# Patient Record
Sex: MALE | Race: WHITE | NOT HISPANIC OR LATINO | URBAN - METROPOLITAN AREA
[De-identification: names, ages, dates, MRNs, and addresses within clinical notes are randomized per-mention and may not be internally consistent; named-entity substitution may affect disease eponyms.]

---

## 2020-05-19 ENCOUNTER — INPATIENT (INPATIENT)
Facility: HOSPITAL | Age: 65
LOS: 1 days | Discharge: HOME | End: 2020-05-21
Payer: COMMERCIAL

## 2020-05-19 VITALS
TEMPERATURE: 99 F | SYSTOLIC BLOOD PRESSURE: 93 MMHG | RESPIRATION RATE: 22 BRPM | DIASTOLIC BLOOD PRESSURE: 62 MMHG | HEART RATE: 82 BPM | OXYGEN SATURATION: 98 %

## 2020-05-19 LAB
ALBUMIN SERPL ELPH-MCNC: 3.2 G/DL — LOW (ref 3.5–5.2)
ALP SERPL-CCNC: 60 U/L — SIGNIFICANT CHANGE UP (ref 30–115)
ALT FLD-CCNC: 86 U/L — HIGH (ref 0–41)
ANION GAP SERPL CALC-SCNC: 14 MMOL/L — SIGNIFICANT CHANGE UP (ref 7–14)
AST SERPL-CCNC: 75 U/L — HIGH (ref 0–41)
BASOPHILS # BLD AUTO: 0.04 K/UL — SIGNIFICANT CHANGE UP (ref 0–0.2)
BASOPHILS NFR BLD AUTO: 0.4 % — SIGNIFICANT CHANGE UP (ref 0–1)
BILIRUB SERPL-MCNC: 0.6 MG/DL — SIGNIFICANT CHANGE UP (ref 0.2–1.2)
BUN SERPL-MCNC: 18 MG/DL — SIGNIFICANT CHANGE UP (ref 10–20)
CALCIUM SERPL-MCNC: 8.4 MG/DL — LOW (ref 8.5–10.1)
CHLORIDE SERPL-SCNC: 99 MMOL/L — SIGNIFICANT CHANGE UP (ref 98–110)
CO2 SERPL-SCNC: 20 MMOL/L — SIGNIFICANT CHANGE UP (ref 17–32)
CREAT SERPL-MCNC: 0.9 MG/DL — SIGNIFICANT CHANGE UP (ref 0.7–1.5)
D DIMER BLD IA.RAPID-MCNC: 674 NG/ML DDU — HIGH (ref 0–230)
EOSINOPHIL # BLD AUTO: 0.07 K/UL — SIGNIFICANT CHANGE UP (ref 0–0.7)
EOSINOPHIL NFR BLD AUTO: 0.8 % — SIGNIFICANT CHANGE UP (ref 0–8)
GLUCOSE SERPL-MCNC: 110 MG/DL — HIGH (ref 70–99)
HCT VFR BLD CALC: 35.2 % — LOW (ref 42–52)
HGB BLD-MCNC: 12.4 G/DL — LOW (ref 14–18)
IMM GRANULOCYTES NFR BLD AUTO: 0.4 % — HIGH (ref 0.1–0.3)
LYMPHOCYTES # BLD AUTO: 2.06 K/UL — SIGNIFICANT CHANGE UP (ref 1.2–3.4)
LYMPHOCYTES # BLD AUTO: 22.2 % — SIGNIFICANT CHANGE UP (ref 20.5–51.1)
MCHC RBC-ENTMCNC: 32.1 PG — HIGH (ref 27–31)
MCHC RBC-ENTMCNC: 35.2 G/DL — SIGNIFICANT CHANGE UP (ref 32–37)
MCV RBC AUTO: 91.2 FL — SIGNIFICANT CHANGE UP (ref 80–94)
MONOCYTES # BLD AUTO: 1.04 K/UL — HIGH (ref 0.1–0.6)
MONOCYTES NFR BLD AUTO: 11.2 % — HIGH (ref 1.7–9.3)
NEUTROPHILS # BLD AUTO: 6.04 K/UL — SIGNIFICANT CHANGE UP (ref 1.4–6.5)
NEUTROPHILS NFR BLD AUTO: 65 % — SIGNIFICANT CHANGE UP (ref 42.2–75.2)
NRBC # BLD: 0 /100 WBCS — SIGNIFICANT CHANGE UP (ref 0–0)
NT-PROBNP SERPL-SCNC: 4787 PG/ML — HIGH (ref 0–300)
PLATELET # BLD AUTO: 254 K/UL — SIGNIFICANT CHANGE UP (ref 130–400)
POTASSIUM SERPL-MCNC: 4.8 MMOL/L — SIGNIFICANT CHANGE UP (ref 3.5–5)
POTASSIUM SERPL-SCNC: 4.8 MMOL/L — SIGNIFICANT CHANGE UP (ref 3.5–5)
PROT SERPL-MCNC: 6.7 G/DL — SIGNIFICANT CHANGE UP (ref 6–8)
RBC # BLD: 3.86 M/UL — LOW (ref 4.7–6.1)
RBC # FLD: 12.2 % — SIGNIFICANT CHANGE UP (ref 11.5–14.5)
SODIUM SERPL-SCNC: 133 MMOL/L — LOW (ref 135–146)
TROPONIN T SERPL-MCNC: 0.57 NG/ML — CRITICAL HIGH
WBC # BLD: 9.29 K/UL — SIGNIFICANT CHANGE UP (ref 4.8–10.8)
WBC # FLD AUTO: 9.29 K/UL — SIGNIFICANT CHANGE UP (ref 4.8–10.8)

## 2020-05-19 PROCEDURE — 71046 X-RAY EXAM CHEST 2 VIEWS: CPT | Mod: 26

## 2020-05-19 PROCEDURE — 93010 ELECTROCARDIOGRAM REPORT: CPT

## 2020-05-19 PROCEDURE — 99285 EMERGENCY DEPT VISIT HI MDM: CPT

## 2020-05-19 NOTE — ED PROVIDER NOTE - PHYSICAL EXAMINATION
CONST: Well appearing in NAD  EYES: PERRL, EOMI, Sclera and conjunctiva clear.  ENT: No nasal discharge. Oropharynx normal appearing, no erythema or exudates. No abscess or swelling. Uvula midline.   NECK: Non-tender, no meningeal signs. normal ROM. supple   CARD: Normal S1 S2; Normal rate and rhythm  RESP: Equal BS B/L, No wheezes, rhonchi or rales. No distress  GI: Soft, non-tender, non-distended. no cva tenderness. normal BS  MS: Normal ROM in all extremities. No midline spinal tenderness. pulses 2 +. no calf tenderness or swelling  SKIN: Warm, dry, no acute rashes. Good turgor  NEURO: A&Ox3, No focal deficits. Strength 5/5 with no sensory deficits. Steady gait. Finger to nose intact. Negative pronator drift

## 2020-05-19 NOTE — ED PROVIDER NOTE - OBJECTIVE STATEMENT
64 year old male with pmhx of htn, and hld, cad, recent stent placed 1 week ago at [facility], presents with dyspnea on exertion, intermittent x 1 week. pt denies chest pain, cough, fever, chills, calf pain or swelling.

## 2020-05-19 NOTE — ED ADULT NURSE NOTE - OBJECTIVE STATEMENT
Patient presents to ED c/o dyspnea on exertion associated with intermittent chest pain x 1 week. Patient states he had a stent placed one week ago

## 2020-05-19 NOTE — ED PROVIDER NOTE - ATTENDING CONTRIBUTION TO CARE
I personally evaluated the patient. I reviewed the Resident’s or Physician Assistant’s note (as assigned above), and agree with the findings and plan except as documented in my note.    64 year old male with pmhx of htn, and hld, cad, recent stent placed 1 week ago at [facility], presents with dyspnea on exertion, intermittent x 1 week. No fever. No cough. No abd pain. no leg swelling.     CONSTITUTIONAL: Well-developed; well-nourished; in no acute distress. Sitting up and providing appropriate history and physical examination  SKIN: skin exam is warm and dry, no acute rash.  HEAD: Normocephalic; atraumatic.  EYES: PERRL, 3 mm bilateral, no nystagmus, EOM intact; conjunctiva and sclera clear.  ENT: No nasal discharge; airway clear.  NECK: Supple; non tender.+ full passive ROM in all directions. No JVD  CARD: S1, S2 normal; no murmurs, gallops, or rubs. Regular rate and rhythm. + Symmetric Strong Pulses  RESP: bibasilar rales   ABD: soft; non-distended; non-tender. No Rebound, No Gaurding, No signs of peritnitis, No CVA tenderness  EXT: Normal ROM. No clubbing, cyanosis or edema. Dp and Pt Pulses intact. Cap refill less than 3 seconds  NEURO: CN 2-12 intact, normal finger to nose, normal romberg, stable gait, no sensory or motor deficits, Alert, oriented, grossly unremarkable. No Focal deficits. GCS 15. NIH 0  PSYCH: Cooperative, appropriate.    Plan- labs, ecg, cxr, reassess

## 2020-05-19 NOTE — ED ADULT TRIAGE NOTE - CHIEF COMPLAINT QUOTE
S/p cardiac stent dc home yesterday, has been having SOB since last night, tested 3x for COVID, negative 3x

## 2020-05-19 NOTE — ED PROVIDER NOTE - PROGRESS NOTE DETAILS
discussed labs with dr oscar, pt has elevated trop and bnp, will admit to tele after CT chest is performed New onset chf, elevated troponin. Cardiology consulted. Admitted to telemetry

## 2020-05-19 NOTE — ED PROVIDER NOTE - CARE PLAN
Principal Discharge DX:	Dyspnea on exertion  Secondary Diagnosis:	CHF (congestive heart failure)  Secondary Diagnosis:	Elevated troponin

## 2020-05-20 ENCOUNTER — TRANSCRIPTION ENCOUNTER (OUTPATIENT)
Age: 65
End: 2020-05-20

## 2020-05-20 DIAGNOSIS — Z98.890 OTHER SPECIFIED POSTPROCEDURAL STATES: Chronic | ICD-10-CM

## 2020-05-20 LAB
ALBUMIN SERPL ELPH-MCNC: 3.1 G/DL — LOW (ref 3.5–5.2)
ALP SERPL-CCNC: 58 U/L — SIGNIFICANT CHANGE UP (ref 30–115)
ALT FLD-CCNC: 78 U/L — HIGH (ref 0–41)
ANION GAP SERPL CALC-SCNC: 14 MMOL/L — SIGNIFICANT CHANGE UP (ref 7–14)
AST SERPL-CCNC: 50 U/L — HIGH (ref 0–41)
BASOPHILS # BLD AUTO: 0.04 K/UL — SIGNIFICANT CHANGE UP (ref 0–0.2)
BASOPHILS NFR BLD AUTO: 0.5 % — SIGNIFICANT CHANGE UP (ref 0–1)
BILIRUB SERPL-MCNC: 0.6 MG/DL — SIGNIFICANT CHANGE UP (ref 0.2–1.2)
BUN SERPL-MCNC: 17 MG/DL — SIGNIFICANT CHANGE UP (ref 10–20)
CALCIUM SERPL-MCNC: 8.5 MG/DL — SIGNIFICANT CHANGE UP (ref 8.5–10.1)
CHLORIDE SERPL-SCNC: 103 MMOL/L — SIGNIFICANT CHANGE UP (ref 98–110)
CO2 SERPL-SCNC: 21 MMOL/L — SIGNIFICANT CHANGE UP (ref 17–32)
CREAT SERPL-MCNC: 0.8 MG/DL — SIGNIFICANT CHANGE UP (ref 0.7–1.5)
EOSINOPHIL # BLD AUTO: 0.09 K/UL — SIGNIFICANT CHANGE UP (ref 0–0.7)
EOSINOPHIL NFR BLD AUTO: 1.1 % — SIGNIFICANT CHANGE UP (ref 0–8)
GLUCOSE SERPL-MCNC: 95 MG/DL — SIGNIFICANT CHANGE UP (ref 70–99)
HCT VFR BLD CALC: 33.2 % — LOW (ref 42–52)
HCV AB S/CO SERPL IA: 0.03 COI — SIGNIFICANT CHANGE UP
HCV AB SERPL-IMP: SIGNIFICANT CHANGE UP
HGB BLD-MCNC: 11.2 G/DL — LOW (ref 14–18)
IMM GRANULOCYTES NFR BLD AUTO: 0.3 % — SIGNIFICANT CHANGE UP (ref 0.1–0.3)
LYMPHOCYTES # BLD AUTO: 2.5 K/UL — SIGNIFICANT CHANGE UP (ref 1.2–3.4)
LYMPHOCYTES # BLD AUTO: 31.9 % — SIGNIFICANT CHANGE UP (ref 20.5–51.1)
MAGNESIUM SERPL-MCNC: 2.2 MG/DL — SIGNIFICANT CHANGE UP (ref 1.8–2.4)
MCHC RBC-ENTMCNC: 30.6 PG — SIGNIFICANT CHANGE UP (ref 27–31)
MCHC RBC-ENTMCNC: 33.7 G/DL — SIGNIFICANT CHANGE UP (ref 32–37)
MCV RBC AUTO: 90.7 FL — SIGNIFICANT CHANGE UP (ref 80–94)
MONOCYTES # BLD AUTO: 0.9 K/UL — HIGH (ref 0.1–0.6)
MONOCYTES NFR BLD AUTO: 11.5 % — HIGH (ref 1.7–9.3)
NEUTROPHILS # BLD AUTO: 4.29 K/UL — SIGNIFICANT CHANGE UP (ref 1.4–6.5)
NEUTROPHILS NFR BLD AUTO: 54.7 % — SIGNIFICANT CHANGE UP (ref 42.2–75.2)
NRBC # BLD: 0 /100 WBCS — SIGNIFICANT CHANGE UP (ref 0–0)
PLATELET # BLD AUTO: 254 K/UL — SIGNIFICANT CHANGE UP (ref 130–400)
POTASSIUM SERPL-MCNC: 3.7 MMOL/L — SIGNIFICANT CHANGE UP (ref 3.5–5)
POTASSIUM SERPL-SCNC: 3.7 MMOL/L — SIGNIFICANT CHANGE UP (ref 3.5–5)
PROT SERPL-MCNC: 6.2 G/DL — SIGNIFICANT CHANGE UP (ref 6–8)
RBC # BLD: 3.66 M/UL — LOW (ref 4.7–6.1)
RBC # FLD: 12.2 % — SIGNIFICANT CHANGE UP (ref 11.5–14.5)
SARS-COV-2 RNA SPEC QL NAA+PROBE: SIGNIFICANT CHANGE UP
SODIUM SERPL-SCNC: 138 MMOL/L — SIGNIFICANT CHANGE UP (ref 135–146)
TROPONIN T SERPL-MCNC: 0.47 NG/ML — CRITICAL HIGH
WBC # BLD: 7.84 K/UL — SIGNIFICANT CHANGE UP (ref 4.8–10.8)
WBC # FLD AUTO: 7.84 K/UL — SIGNIFICANT CHANGE UP (ref 4.8–10.8)

## 2020-05-20 PROCEDURE — 71275 CT ANGIOGRAPHY CHEST: CPT | Mod: 26

## 2020-05-20 PROCEDURE — 93010 ELECTROCARDIOGRAM REPORT: CPT

## 2020-05-20 RX ORDER — CARVEDILOL PHOSPHATE 80 MG/1
1 CAPSULE, EXTENDED RELEASE ORAL
Qty: 30 | Refills: 0
Start: 2020-05-20 | End: 2020-06-18

## 2020-05-20 RX ORDER — FUROSEMIDE 40 MG
40 TABLET ORAL DAILY
Refills: 0 | Status: DISCONTINUED | OUTPATIENT
Start: 2020-05-20 | End: 2020-05-21

## 2020-05-20 RX ORDER — ASPIRIN/CALCIUM CARB/MAGNESIUM 324 MG
81 TABLET ORAL DAILY
Refills: 0 | Status: DISCONTINUED | OUTPATIENT
Start: 2020-05-20 | End: 2020-05-21

## 2020-05-20 RX ORDER — CLOPIDOGREL BISULFATE 75 MG/1
75 TABLET, FILM COATED ORAL DAILY
Refills: 0 | Status: DISCONTINUED | OUTPATIENT
Start: 2020-05-20 | End: 2020-05-21

## 2020-05-20 RX ORDER — CEFDINIR 250 MG/5ML
1 POWDER, FOR SUSPENSION ORAL
Qty: 0 | Refills: 0 | DISCHARGE

## 2020-05-20 RX ORDER — ATORVASTATIN CALCIUM 80 MG/1
80 TABLET, FILM COATED ORAL AT BEDTIME
Refills: 0 | Status: DISCONTINUED | OUTPATIENT
Start: 2020-05-20 | End: 2020-05-21

## 2020-05-20 RX ORDER — AZITHROMYCIN 500 MG/1
1 TABLET, FILM COATED ORAL
Qty: 0 | Refills: 0 | DISCHARGE

## 2020-05-20 RX ORDER — ASPIRIN/CALCIUM CARB/MAGNESIUM 324 MG
325 TABLET ORAL ONCE
Refills: 0 | Status: COMPLETED | OUTPATIENT
Start: 2020-05-20 | End: 2020-05-20

## 2020-05-20 RX ORDER — ENOXAPARIN SODIUM 100 MG/ML
40 INJECTION SUBCUTANEOUS DAILY
Refills: 0 | Status: DISCONTINUED | OUTPATIENT
Start: 2020-05-20 | End: 2020-05-21

## 2020-05-20 RX ORDER — SPIRONOLACTONE 25 MG/1
1 TABLET, FILM COATED ORAL
Qty: 30 | Refills: 0
Start: 2020-05-20 | End: 2020-06-18

## 2020-05-20 RX ADMIN — Medication 81 MILLIGRAM(S): at 12:21

## 2020-05-20 RX ADMIN — Medication 40 MILLIGRAM(S): at 06:27

## 2020-05-20 RX ADMIN — ATORVASTATIN CALCIUM 80 MILLIGRAM(S): 80 TABLET, FILM COATED ORAL at 21:37

## 2020-05-20 RX ADMIN — CLOPIDOGREL BISULFATE 75 MILLIGRAM(S): 75 TABLET, FILM COATED ORAL at 12:21

## 2020-05-20 RX ADMIN — Medication 325 MILLIGRAM(S): at 01:44

## 2020-05-20 NOTE — DISCHARGE NOTE NURSING/CASE MANAGEMENT/SOCIAL WORK - PATIENT PORTAL LINK FT
You can access the FollowMyHealth Patient Portal offered by [facility] by registering at the following website: [email or web address]. By joining [facility]’s FollowMyHealth portal, you will also be able to view your health information using other applications (apps) compatible with our system.

## 2020-05-20 NOTE — H&P ADULT - ASSESSMENT
64 year old male patient with past medical history of hypertension and dyslipidemia, admitted last Monday to [facility] for ACS, underwent cardiac cath with stent placement presenting for dyspnea and generalized malaise     # Dyspnea on exertion, suspected new onset CHF secondary to ischemic cardiomyopathy   - CT Angio on admission showing interstitial edema, BNP 4787   - On Lasix 20 mg PO daily at home, will switch to 40 mg IV daily   - Strict Is and Os and daily weights   - Fluid restriction 1200 mL/day   - Follow-up echocardiogram   - Was on metoprolol ER 25 mg daily stopped during his most recent [facility] hospitalization for hypotension. Will hold off now given suspicion of CHF exacerbation. When stable on discharge consider restarting beta blockers and starting ACEi or ARB   - Follow up SARS-CoV-2 PCR (negative x 3 at [facility] last week)   - Will hold antibiotics for now given low suspicion for CAP     # CAD s/p stent in May 2020   - Continue Aspirin 81 mg daily, Plavix 75 mg daily and Lipitor 80 mg qhs. Consider restarting beta blockers on discharge once CHF exacerbation controlled   - Troponin T 0.57 with no active chest pain, likely demand ischemia from CHF vs elevated from previous ACS. Continue to trend     # History of hypertension   - Patient states that his BP tends to fluctuate. Hypotensive on admission. Will hold anti-hypertensive meds for now     # Dyslipidemia   - Continue Lipitor 80 mg qhs     # Transaminitis : Likely statin induced   - Given recent ACS will continue Lipitor for now.   - Monitor LFTs if up-trending consider holding Lipitor or decreasing the dose     # Miscellaneous   - DVT prophylaxis : Lovenox 40 mg daily   - GI prophylaxis not indicated   - Diet : DASH TLC fluid restriction 1200 mL/day   - Activity : Ambulate as tolerated   - Code status : Full code 64 year old male patient with past medical history of hypertension and dyslipidemia, admitted last Monday to [facility] for ACS, underwent cardiac cath with stent placement presenting for dyspnea and generalized malaise     # Dyspnea on exertion, suspected new onset CHF secondary to ischemic cardiomyopathy   - CT Angio on admission showing interstitial edema, BNP 4787   - On Lasix 20 mg PO daily at home, will switch to 40 mg IV daily   - Strict Is and Os and daily weights   - Fluid restriction 1200 mL/day   - Follow-up echocardiogram   - Was on metoprolol ER 25 mg daily stopped during his most recent [facility] hospitalization for hypotension. Will hold off now given suspicion of CHF exacerbation. When stable on discharge consider restarting beta blockers and starting ACEi or ARB   - Follow up SARS-CoV-2 PCR (negative x 3 at [facility] last week)   - Will hold antibiotics for now given low suspicion for CAP   will add Spironolactone 25mg po daily  add Coreg 3.125mg po q12    # CAD s/p stent in May 2020   - Continue Aspirin 81 mg daily, Plavix 75 mg daily and Lipitor 80 mg qhs. Consider restarting beta blockers on discharge once CHF exacerbation controlled   - Troponin T 0.57 with no active chest pain, likely demand ischemia from CHF vs elevated from previous ACS. Continue to trend     # History of hypertension   - Patient states that his BP tends to fluctuate. Hypotensive on admission. Will hold anti-hypertensive meds for now     # Dyslipidemia   - Continue Lipitor 80 mg qhs     # Transaminitis : Likely statin induced   - Given recent ACS will continue Lipitor for now.   - Monitor LFTs if up-trending consider holding Lipitor or decreasing the dose     # Miscellaneous   - DVT prophylaxis : Lovenox 40 mg daily   - GI prophylaxis not indicated   - Diet : DASH TLC fluid restriction 1200 mL/day   - Activity : Ambulate as tolerated   - Code status : Full code

## 2020-05-20 NOTE — DISCHARGE NOTE PROVIDER - NSDCCPCAREPLAN_GEN_ALL_CORE_FT
PRINCIPAL DISCHARGE DIAGNOSIS  Diagnosis: Dyspnea on exertion  Assessment and Plan of Treatment: you have suspected congestive heart failure.  your breathing and oxygen levels improved with IV lasix.  please take medications as presribed and follow up as outpatient with Dr. Monahan within 1 week.

## 2020-05-20 NOTE — H&P ADULT - NSHPPHYSICALEXAM_GEN_ALL_CORE
ICU Vital Signs Last 24 Hrs  T(C): 37.3 (19 May 2020 21:31), Max: 37.3 (19 May 2020 21:31)  T(F): 99.2 (19 May 2020 21:31), Max: 99.2 (19 May 2020 21:31)  HR: 82 (19 May 2020 21:31) (82 - 82)  BP: 93/62 (19 May 2020 21:31) (93/62 - 93/62)  BP(mean): --  ABP: --  ABP(mean): --  RR: 22 (19 May 2020 21:31) (22 - 22)  SpO2: 98% (19 May 2020 21:31) (98% - 98%)    General : No acute distress, alert and oriented   Pulmonary : On room air, no signs of respiratory distress, decreased breath sounds at the bases, no crackles or wheezing   Cardiovascular : Regular S1S2   Abdomen : Soft nontender nondistended   Extremities : No lower extremity edema, positive peripheral pulses

## 2020-05-20 NOTE — DISCHARGE NOTE PROVIDER - HOSPITAL COURSE
64 year old male patient with past medical history of hypertension and dyslipidemia, admitted last Monday to [facility] for ACS, underwent cardiac cath with stent placement (unknown how many stents and which vessels, not documented in discharge document from [facility]) and was started on Aspirin, Lipitor, Metoprolol and Brilinta. The patient states that he was discharged after 6 days however was re-admitted on Sunday for general malaise and intermittent dyspnea on exertion. His Brilinta was switched to Plavix and he was started on Azithromycin. Per the patient he was swabbed 3 times for COVID-19 with negative results. He was discharged on Monday however continued experiencing malaise. He also reports intermittent productive cough and denies any dyspnea currently. Also no fever / chills, no weight gain, no wheezing, no chest pain, no palpitations, no abdominal pain, no nausea / vomiting, no diarrhea, no  symptoms. He was told by Dr. Monahan to present to the ED for further evaluation     In the ED : Troponin T 0.57, BNP 4787. BP 93/62, HR 82, RR 22, Temp 99.2, SpO2 98 % on room air. Ct Angio of the chest negative for PE or pulmonary consolidation but demonstrated small left and trace right pleural effusions with bilateral interlobular septal thickening suggesting edema pattern. Patient was admitted for suspected new onset CHF.    patient received IV Lasix with resolution of the dyspnea. patient is medically stable and cleared to be discharged home to follow up with Dr. Monahan in 1 week as outpatient.

## 2020-05-20 NOTE — H&P ADULT - NSHPLABSRESULTS_GEN_ALL_CORE
CBC :                           12.4   9.29  )-----------( 254      ( 19 May 2020 22:30 )             35.2     CMP :     05-19    133<L>  |  99  |  18  ----------------------------<  110<H>  4.8   |  20  |  0.9    Ca    8.4<L>      19 May 2020 22:30    TPro  6.7  /  Alb  3.2<L>  /  TBili  0.6  /  DBili  x   /  AST  75<H>  /  ALT  86<H>  /  AlkPhos  60  05-19    CARDIAC MARKERS ( 19 May 2020 22:30 )  x     / 0.57 ng/mL / x     / x     / x        Coagulation panel :   D-Dimer Assay, Quantitative (05.19.20 @ 22:50)    D-Dimer Assay, Quantitative: 674 ng/mL DDU    Serum Pro-Brain Natriuretic Peptide (05.19.20 @ 22:30)    Serum Pro-Brain Natriuretic Peptide: 4787 pg/mL

## 2020-05-20 NOTE — H&P ADULT - HISTORY OF PRESENT ILLNESS
64 year old male patient with past medical history of hypertension and dyslipidemia, admitted last Monday to [facility] for ACS, underwent cardiac cath with stent placement (unknown how many stents and which vessels, not documented in discharge document from [facility]) and was started on Aspirin, Lipitor, Metoprolol and Brilinta. The patient states that he was discharged after 6 days however was re-admitted on Sunday for general malaise and intermittent dyspnea on exertion. His Brilinta was switched to Plavix and he was started on Azithromycin. Per the patient he was swabbed 3 times for COVID-19 with negative results. He was discharged on Monday however continued experiencing malaise. He also reports intermittent productive cough and denies any dyspnea currently. Also no fever / chills, no weight gain, no wheezing, no chest pain, no palpitations, no abdominal pain, no nausea / vomiting, no diarrhea, no  symptoms. He was told by Dr. Monahan to present to the ED for further evaluation   In the ED : Troponin T 0.57, BNP 4787. BP 93/62, HR 82, RR 22, Temp 99.2, SpO2 98 % on room air. Ct Angio of the chest negative for PE or pulmonary consolidation but demonstrated small left and trace right pleural effusions with bilateral interlobular septal thickening suggesting edema pattern. Patient was admitted for suspected new onset CHF 64 year old male patient with past medical history of hypertension and dyslipidemia, admitted last Monday to [facility] for ACS, underwent cardiac cath with stent placement (unknown how many stents and which vessels, not documented in discharge document from [facility]) and was started on Aspirin, Lipitor, Metoprolol and Brilinta. The patient states that he was discharged after 6 days however was re-admitted on Sunday for general malaise and intermittent dyspnea on exertion. His Brilinta was switched to Plavix and he was started on Azithromycin. Per the patient he was swabbed 3 times for COVID-19 with negative results. He was discharged on Monday however continued experiencing malaise. He also reports intermittent productive cough and denies any dyspnea currently. Also no fever / chills, no weight gain, no wheezing, no chest pain, no palpitations, no abdominal pain, no nausea / vomiting, no diarrhea, no  symptoms. He was told by Dr. Monahan to present to the ED for further evaluation   In the ED : Troponin T 0.57, BNP 4787. BP 93/62, HR 82, RR 22, Temp 99.2, SpO2 98 % on room air. Ct Angio of the chest negative for PE or pulmonary consolidation but demonstrated small left and trace right pleural effusions with bilateral interlobular septal thickening suggesting edema pattern. Patient was admitted for suspected new onset CHF  feels better now but not good he says

## 2020-05-21 VITALS
RESPIRATION RATE: 20 BRPM | DIASTOLIC BLOOD PRESSURE: 65 MMHG | TEMPERATURE: 97 F | HEART RATE: 73 BPM | SYSTOLIC BLOOD PRESSURE: 96 MMHG

## 2020-05-21 LAB
ANION GAP SERPL CALC-SCNC: 15 MMOL/L — HIGH (ref 7–14)
BASOPHILS # BLD AUTO: 0.05 K/UL — SIGNIFICANT CHANGE UP (ref 0–0.2)
BASOPHILS NFR BLD AUTO: 0.7 % — SIGNIFICANT CHANGE UP (ref 0–1)
BUN SERPL-MCNC: 19 MG/DL — SIGNIFICANT CHANGE UP (ref 10–20)
CALCIUM SERPL-MCNC: 8.5 MG/DL — SIGNIFICANT CHANGE UP (ref 8.5–10.1)
CHLORIDE SERPL-SCNC: 101 MMOL/L — SIGNIFICANT CHANGE UP (ref 98–110)
CO2 SERPL-SCNC: 23 MMOL/L — SIGNIFICANT CHANGE UP (ref 17–32)
CREAT SERPL-MCNC: 0.9 MG/DL — SIGNIFICANT CHANGE UP (ref 0.7–1.5)
EOSINOPHIL # BLD AUTO: 0.09 K/UL — SIGNIFICANT CHANGE UP (ref 0–0.7)
EOSINOPHIL NFR BLD AUTO: 1.3 % — SIGNIFICANT CHANGE UP (ref 0–8)
GLUCOSE SERPL-MCNC: 103 MG/DL — HIGH (ref 70–99)
HCT VFR BLD CALC: 33.8 % — LOW (ref 42–52)
HGB BLD-MCNC: 12 G/DL — LOW (ref 14–18)
IMM GRANULOCYTES NFR BLD AUTO: 0.6 % — HIGH (ref 0.1–0.3)
LYMPHOCYTES # BLD AUTO: 2.05 K/UL — SIGNIFICANT CHANGE UP (ref 1.2–3.4)
LYMPHOCYTES # BLD AUTO: 28.6 % — SIGNIFICANT CHANGE UP (ref 20.5–51.1)
MAGNESIUM SERPL-MCNC: 2.2 MG/DL — SIGNIFICANT CHANGE UP (ref 1.8–2.4)
MCHC RBC-ENTMCNC: 32.1 PG — HIGH (ref 27–31)
MCHC RBC-ENTMCNC: 35.5 G/DL — SIGNIFICANT CHANGE UP (ref 32–37)
MCV RBC AUTO: 90.4 FL — SIGNIFICANT CHANGE UP (ref 80–94)
MONOCYTES # BLD AUTO: 0.77 K/UL — HIGH (ref 0.1–0.6)
MONOCYTES NFR BLD AUTO: 10.7 % — HIGH (ref 1.7–9.3)
NEUTROPHILS # BLD AUTO: 4.18 K/UL — SIGNIFICANT CHANGE UP (ref 1.4–6.5)
NEUTROPHILS NFR BLD AUTO: 58.1 % — SIGNIFICANT CHANGE UP (ref 42.2–75.2)
NRBC # BLD: 0 /100 WBCS — SIGNIFICANT CHANGE UP (ref 0–0)
PLATELET # BLD AUTO: 266 K/UL — SIGNIFICANT CHANGE UP (ref 130–400)
POTASSIUM SERPL-MCNC: 4 MMOL/L — SIGNIFICANT CHANGE UP (ref 3.5–5)
POTASSIUM SERPL-SCNC: 4 MMOL/L — SIGNIFICANT CHANGE UP (ref 3.5–5)
RBC # BLD: 3.74 M/UL — LOW (ref 4.7–6.1)
RBC # FLD: 12.1 % — SIGNIFICANT CHANGE UP (ref 11.5–14.5)
SODIUM SERPL-SCNC: 139 MMOL/L — SIGNIFICANT CHANGE UP (ref 135–146)
WBC # BLD: 7.18 K/UL — SIGNIFICANT CHANGE UP (ref 4.8–10.8)
WBC # FLD AUTO: 7.18 K/UL — SIGNIFICANT CHANGE UP (ref 4.8–10.8)

## 2020-05-21 PROCEDURE — 93306 TTE W/DOPPLER COMPLETE: CPT | Mod: 26

## 2020-05-21 RX ADMIN — CLOPIDOGREL BISULFATE 75 MILLIGRAM(S): 75 TABLET, FILM COATED ORAL at 12:15

## 2020-05-21 RX ADMIN — Medication 81 MILLIGRAM(S): at 12:15

## 2020-05-21 NOTE — PROGRESS NOTE ADULT - SUBJECTIVE AND OBJECTIVE BOX
HPI  65 yo M PMH of hypertension and dyslipidemia, admitted last Monday to [facility] for ACS, underwent cardiac cath with stent placement (unknown how many stents and which vessels, not documented in discharge document from [facility]) and was started on Aspirin, Lipitor, Metoprolol and Brilinta. The patient states that he was discharged after 6 days however was re-admitted on Sunday for general malaise and intermittent dyspnea on exertion. His Brilinta was switched to Plavix and he was started on Azithromycin. Per the patient he was swabbed 3 times for COVID-19 with negative results. He was discharged on Monday however continued experiencing malaise. He also reports intermittent productive cough and denies any dyspnea currently. Also no fever / chills, no weight gain, no wheezing, no chest pain, no palpitations, no abdominal pain, no nausea / vomiting, no diarrhea, no  symptoms. He was told by Dr. Monahan to present to the ED for further evaluation   In the ED : Troponin T 0.57, BNP 4787. BP 93/62, HR 82, RR 22, Temp 99.2, SpO2 98 % on room air. Ct Angio of the chest negative for PE or pulmonary consolidation but demonstrated small left and trace right pleural effusions with bilateral interlobular septal thickening suggesting edema pattern. Patient was admitted for suspected new onset CHF  feels better now but not good he says     Today is hospital day 1d.     INTERVAL HPI / OVERNIGHT EVENTS:  Patient was examined and seen at bedside. This morning he is resting comfortably in bed and reports no new issues or overnight events.     ROS: Otherwise unremarkable     PAST MEDICAL & SURGICAL HISTORY  Dyslipidemia  Hypertension  Coronary artery disease: s/p stent May 2020  History of left inguinal hernia repair    ALLERGIES  No Known Allergies    MEDICATIONS  STANDING MEDICATIONS  aspirin enteric coated 81 milliGRAM(s) Oral daily  atorvastatin 80 milliGRAM(s) Oral at bedtime  clopidogrel Tablet 75 milliGRAM(s) Oral daily  enoxaparin Injectable 40 milliGRAM(s) SubCutaneous daily  furosemide   Injectable 40 milliGRAM(s) IV Push daily    PRN MEDICATIONS    VITALS:  T(F): 98.4  HR: 73  BP: 86/58  RR: 18  SpO2: 98%    PHYSICAL EXAM  GEN: NAD, Resting comfortably in bed  PULM: Clear to auscultation bilaterally, No wheezes  CVS: Regular rate and rhythm, S1-S2, no murmurs  ABD: Soft, non-tender, non-distended, no guarding  EXT: No edema  NEURO: AAOx3, no focal deficits    LABS                        12.0   7.18  )-----------( 266      ( 21 May 2020 05:21 )             33.8     05-20    138  |  103  |  17  ----------------------------<  95  3.7   |  21  |  0.8    Ca    8.5      20 May 2020 04:57  Mg     2.2     05-20    TPro  6.2  /  Alb  3.1<L>  /  TBili  0.6  /  DBili  x   /  AST  50<H>  /  ALT  78<H>  /  AlkPhos  58  05-20      CARDIAC MARKERS ( 20 May 2020 04:57 )  x     / 0.47 ng/mL / x     / x     / x      CARDIAC MARKERS ( 19 May 2020 22:30 )  x     / 0.57 ng/mL / x     / x     / x          RADIOLOGY    < from: CT Angio Chest w/ IV Cont (05.20.20 @ 00:57) >  IMPRESSION:     No CTA evidence of acute pulmonary embolus or pulmonary consolidation.    Small left and trace right pleural effusions with bilateral interlobular septal thickening suggesting a pulmonary congestive/edema pattern.    < end of copied text > HPI  63 yo M PMH of hypertension and dyslipidemia, admitted last Monday to [facility] for ACS, underwent cardiac cath with stent placement (unknown how many stents and which vessels, not documented in discharge document from [facility]) and was started on Aspirin, Lipitor, Metoprolol and Brilinta. The patient states that he was discharged after 6 days however was re-admitted on Sunday for general malaise and intermittent dyspnea on exertion. His Brilinta was switched to Plavix and he was started on Azithromycin. Per the patient he was swabbed 3 times for COVID-19 with negative results. He was discharged on Monday however continued experiencing malaise. He also reports intermittent productive cough and denies any dyspnea currently. Also no fever / chills, no weight gain, no wheezing, no chest pain, no palpitations, no abdominal pain, no nausea / vomiting, no diarrhea, no  symptoms. He was told by Dr. Monahan to present to the ED for further evaluation   In the ED : Troponin T 0.57, BNP 4787. BP 93/62, HR 82, RR 22, Temp 99.2, SpO2 98 % on room air. Ct Angio of the chest negative for PE or pulmonary consolidation but demonstrated small left and trace right pleural effusions with bilateral interlobular septal thickening suggesting edema pattern. Patient was admitted for suspected new onset CHF  feels better now but not good he says     Today is hospital day 1d.     INTERVAL HPI / OVERNIGHT EVENTS:  Patient was examined and seen at bedside. This morning he is resting comfortably in bed and reports no new issues or overnight events. Ready to go home, wants to spend time with family. Patient slept well. Denied CP, SOB, abdominal pain, or changes in BM or urinary habits.     ROS: Otherwise unremarkable     PAST MEDICAL & SURGICAL HISTORY  Dyslipidemia  Hypertension  Coronary artery disease: s/p stent May 2020  History of left inguinal hernia repair    ALLERGIES  No Known Allergies    MEDICATIONS  STANDING MEDICATIONS  aspirin enteric coated 81 milliGRAM(s) Oral daily  atorvastatin 80 milliGRAM(s) Oral at bedtime  clopidogrel Tablet 75 milliGRAM(s) Oral daily  enoxaparin Injectable 40 milliGRAM(s) SubCutaneous daily  furosemide   Injectable 40 milliGRAM(s) IV Push daily    PRN MEDICATIONS    VITALS:  T(F): 98.4  HR: 73  BP: 86/58  RR: 18  SpO2: 98%    PHYSICAL EXAM  GEN: NAD, Resting comfortably in bed  PULM: Clear to auscultation bilaterally, No wheezes. Decreased lung sounds bilaterally in the lung bases, L > R  CVS: Regular rate and rhythm, S1-S2, no murmurs  ABD: Soft, non-tender, non-distended, no guarding  EXT: No edema  NEURO: AAOx3, no focal deficits    LABS                        12.0   7.18  )-----------( 266      ( 21 May 2020 05:21 )             33.8     05-20    138  |  103  |  17  ----------------------------<  95  3.7   |  21  |  0.8    Ca    8.5      20 May 2020 04:57  Mg     2.2     05-20    TPro  6.2  /  Alb  3.1<L>  /  TBili  0.6  /  DBili  x   /  AST  50<H>  /  ALT  78<H>  /  AlkPhos  58  05-20      CARDIAC MARKERS ( 20 May 2020 04:57 )  x     / 0.47 ng/mL / x     / x     / x      CARDIAC MARKERS ( 19 May 2020 22:30 )  x     / 0.57 ng/mL / x     / x     / x          RADIOLOGY    < from: CT Angio Chest w/ IV Cont (05.20.20 @ 00:57) >  IMPRESSION:     No CTA evidence of acute pulmonary embolus or pulmonary consolidation.    Small left and trace right pleural effusions with bilateral interlobular septal thickening suggesting a pulmonary congestive/edema pattern.    < end of copied text >

## 2020-05-21 NOTE — PROGRESS NOTE ADULT - ASSESSMENT
64 year old male patient with past medical history of hypertension and dyslipidemia, admitted last Monday to [facility] for ACS, underwent cardiac cath with stent placement presenting for dyspnea and generalized malaise     #Dyspnea on exertion, suspected new onset CHF secondary to ischemic cardiomyopathy   - DC Today as per Dr. Monahan  - CT Angio on admission showing interstitial edema, BNP 4787   - On Lasix 20 mg PO daily at home, will switch to 40 mg IV daily   - Strict Is and Os and daily weights   - Fluid restriction 1200 mL/day   - Follow-up echocardiogram   - Was on metoprolol ER 25 mg daily stopped during his most recent [facility] hospitalization for hypotension. Will hold off now given suspicion of CHF exacerbation. When stable on discharge consider restarting beta blockers and starting ACEi or ARB   - Follow up SARS-CoV-2 PCR (negative x 3 at [facility] last week)   - Will hold antibiotics for now given low suspicion for CAP   will add Spironolactone 25mg po daily  add Coreg 3.125mg po q12    # CAD s/p stent in May 2020   - Continue Aspirin 81 mg daily, Plavix 75 mg daily and Lipitor 80 mg qhs. Consider restarting beta blockers on discharge once CHF exacerbation controlled   - Troponin T 0.57 with no active chest pain, likely demand ischemia from CHF vs elevated from previous ACS. Continue to trend     # History of hypertension   - Patient states that his BP tends to fluctuate. Hypotensive on admission. Will hold anti-hypertensive meds for now     # Dyslipidemia   - Continue Lipitor 80 mg qhs     # Transaminitis : Likely statin induced   - Given recent ACS will continue Lipitor for now.   - Monitor LFTs if up-trending consider holding Lipitor or decreasing the dose     # Miscellaneous   - DVT prophylaxis : Lovenox 40 mg daily   - GI prophylaxis not indicated   - Diet : DASH TLC fluid restriction 1200 mL/day   - Activity : Ambulate as tolerated   - Code status : Full code 64 year old male patient with past medical history of hypertension and dyslipidemia, admitted last Monday to [facility] for ACS, underwent cardiac cath with stent placement presenting for dyspnea and generalized malaise     #Dyspnea on exertion, suspected new onset CHF secondary to ischemic cardiomyopathy   - DC Today as per Dr. Monahan?  - CT Angio on admission showing interstitial edema, BNP 4787   - On Lasix 20 mg PO daily at home, will switch to 40 mg IV daily   - Strict Is and Os and daily weights   - Fluid restriction 1200 mL/day   - Follow-up echocardiogram - went for it   - Was on metoprolol ER 25 mg daily stopped during his most recent [facility] hospitalization for hypotension. Will hold off now given suspicion of CHF exacerbation. When stable on discharge consider restarting beta blockers and starting ACEi or ARB   - SARS-CoV-2 PCR (negative x 3 at [facility] last week) - negative  - Will hold antibiotics for now given low suspicion for CAP   - Low BP, no spironolactone or coreg for now (doesnt appear to have been started)    # CAD s/p stent in May 2020   - Continue Aspirin 81 mg daily, Plavix 75 mg daily and Lipitor 80 mg qhs. Consider restarting beta blockers on discharge once CHF exacerbation controlled   - Troponin T 0.57 with no active chest pain, likely demand ischemia from CHF vs elevated from previous ACS. Continue to trend     # History of hypertension   - Patient states that his BP tends to fluctuate. Hypotensive on admission. Will hold anti-hypertensive meds for now     # Dyslipidemia   - Continue Lipitor 80 mg qhs     # Transaminitis : Likely statin induced   - Given recent ACS will continue Lipitor for now.   - Monitor LFTs if up-trending consider holding Lipitor or decreasing the dose     # Miscellaneous   - DVT prophylaxis : Lovenox 40 mg daily   - GI prophylaxis not indicated   - Diet : DASH TLC fluid restriction 1200 mL/day   - Activity : Ambulate as tolerated   - Code status : Full code 64 year old male patient with past medical history of hypertension and dyslipidemia, admitted last Monday to [facility] for ACS, underwent cardiac cath with stent placement presenting for dyspnea and generalized malaise     #Dyspnea on exertion, suspected new onset CHF secondary to ischemic cardiomyopathy   - DC Today as per Dr. Monahan. He is aware of his echo and will follow it in the office  - CT Angio on admission showing interstitial edema, BNP 4787   - On Lasix 20 mg PO daily at home, will switch to 40 mg IV daily   - Strict Is and Os and daily weights   - Fluid restriction 1200 mL/day   - Follow-up echocardiogram - went for it   - Was on metoprolol ER 25 mg daily stopped during his most recent [facility] hospitalization for hypotension. Will hold off now given suspicion of CHF exacerbation. When stable on discharge consider restarting beta blockers and starting ACEi or ARB   - SARS-CoV-2 PCR (negative x 3 at [facility] last week) - negative  - Will hold antibiotics for now given low suspicion for CAP   - Low BP, no spironolactone or coreg for now (doesnt appear to have been started)    # CAD s/p stent in May 2020   - Continue Aspirin 81 mg daily, Plavix 75 mg daily and Lipitor 80 mg qhs. Consider restarting beta blockers on discharge once CHF exacerbation controlled   - Troponin T 0.57 with no active chest pain, likely demand ischemia from CHF vs elevated from previous ACS. Continue to trend     # History of hypertension   - Patient states that his BP tends to fluctuate. Hypotensive on admission. Will hold anti-hypertensive meds for now     # Dyslipidemia   - Continue Lipitor 80 mg qhs     # Transaminitis : Likely statin induced   - Given recent ACS will continue Lipitor for now.   - Monitor LFTs if up-trending consider holding Lipitor or decreasing the dose     # Miscellaneous   - DVT prophylaxis : Lovenox 40 mg daily   - GI prophylaxis not indicated   - Diet : DASH TLC fluid restriction 1200 mL/day   - Activity : Ambulate as tolerated   - Code status : Full code

## 2020-05-29 DIAGNOSIS — I25.5 ISCHEMIC CARDIOMYOPATHY: ICD-10-CM

## 2020-05-29 DIAGNOSIS — I95.89 OTHER HYPOTENSION: ICD-10-CM

## 2020-05-29 DIAGNOSIS — Z79.02 LONG TERM (CURRENT) USE OF ANTITHROMBOTICS/ANTIPLATELETS: ICD-10-CM

## 2020-05-29 DIAGNOSIS — Z95.5 PRESENCE OF CORONARY ANGIOPLASTY IMPLANT AND GRAFT: ICD-10-CM

## 2020-05-29 DIAGNOSIS — R06.00 DYSPNEA, UNSPECIFIED: ICD-10-CM

## 2020-05-29 DIAGNOSIS — I25.10 ATHEROSCLEROTIC HEART DISEASE OF NATIVE CORONARY ARTERY WITHOUT ANGINA PECTORIS: ICD-10-CM

## 2020-05-29 DIAGNOSIS — R07.89 OTHER CHEST PAIN: ICD-10-CM

## 2020-05-29 DIAGNOSIS — I50.21 ACUTE SYSTOLIC (CONGESTIVE) HEART FAILURE: ICD-10-CM

## 2020-05-29 DIAGNOSIS — E78.5 HYPERLIPIDEMIA, UNSPECIFIED: ICD-10-CM

## 2020-05-29 DIAGNOSIS — Z79.82 LONG TERM (CURRENT) USE OF ASPIRIN: ICD-10-CM

## 2020-05-29 DIAGNOSIS — I21.09 ST ELEVATION (STEMI) MYOCARDIAL INFARCTION INVOLVING OTHER CORONARY ARTERY OF ANTERIOR WALL: ICD-10-CM

## 2020-05-29 DIAGNOSIS — I23.8 OTHER CURRENT COMPLICATIONS FOLLOWING ACUTE MYOCARDIAL INFARCTION: ICD-10-CM

## 2020-05-29 DIAGNOSIS — R74.0 NONSPECIFIC ELEVATION OF LEVELS OF TRANSAMINASE AND LACTIC ACID DEHYDROGENASE [LDH]: ICD-10-CM

## 2020-05-29 DIAGNOSIS — I11.0 HYPERTENSIVE HEART DISEASE WITH HEART FAILURE: ICD-10-CM

## 2020-05-29 DIAGNOSIS — Z87.891 PERSONAL HISTORY OF NICOTINE DEPENDENCE: ICD-10-CM

## 2020-05-29 DIAGNOSIS — Z79.899 OTHER LONG TERM (CURRENT) DRUG THERAPY: ICD-10-CM

## 2020-05-29 DIAGNOSIS — R79.89 OTHER SPECIFIED ABNORMAL FINDINGS OF BLOOD CHEMISTRY: ICD-10-CM

## 2020-08-05 ENCOUNTER — EMERGENCY (EMERGENCY)
Facility: HOSPITAL | Age: 65
LOS: 0 days | Discharge: HOME | End: 2020-08-06
Attending: EMERGENCY MEDICINE | Admitting: EMERGENCY MEDICINE
Payer: COMMERCIAL

## 2020-08-05 VITALS
DIASTOLIC BLOOD PRESSURE: 74 MMHG | SYSTOLIC BLOOD PRESSURE: 119 MMHG | WEIGHT: 139.99 LBS | HEIGHT: 68 IN | TEMPERATURE: 98 F | RESPIRATION RATE: 19 BRPM | OXYGEN SATURATION: 98 % | HEART RATE: 66 BPM

## 2020-08-05 DIAGNOSIS — Z98.890 OTHER SPECIFIED POSTPROCEDURAL STATES: Chronic | ICD-10-CM

## 2020-08-05 DIAGNOSIS — Z95.5 PRESENCE OF CORONARY ANGIOPLASTY IMPLANT AND GRAFT: ICD-10-CM

## 2020-08-05 DIAGNOSIS — E78.5 HYPERLIPIDEMIA, UNSPECIFIED: ICD-10-CM

## 2020-08-05 DIAGNOSIS — I25.10 ATHEROSCLEROTIC HEART DISEASE OF NATIVE CORONARY ARTERY WITHOUT ANGINA PECTORIS: ICD-10-CM

## 2020-08-05 DIAGNOSIS — R07.9 CHEST PAIN, UNSPECIFIED: ICD-10-CM

## 2020-08-05 DIAGNOSIS — I10 ESSENTIAL (PRIMARY) HYPERTENSION: ICD-10-CM

## 2020-08-05 LAB
ALBUMIN SERPL ELPH-MCNC: 4.5 G/DL — SIGNIFICANT CHANGE UP (ref 3.5–5.2)
ALP SERPL-CCNC: 57 U/L — SIGNIFICANT CHANGE UP (ref 30–115)
ALT FLD-CCNC: 33 U/L — SIGNIFICANT CHANGE UP (ref 0–41)
ANION GAP SERPL CALC-SCNC: 12 MMOL/L — SIGNIFICANT CHANGE UP (ref 7–14)
AST SERPL-CCNC: 24 U/L — SIGNIFICANT CHANGE UP (ref 0–41)
BASOPHILS # BLD AUTO: 0.03 K/UL — SIGNIFICANT CHANGE UP (ref 0–0.2)
BASOPHILS NFR BLD AUTO: 0.5 % — SIGNIFICANT CHANGE UP (ref 0–1)
BILIRUB SERPL-MCNC: 0.4 MG/DL — SIGNIFICANT CHANGE UP (ref 0.2–1.2)
BUN SERPL-MCNC: 19 MG/DL — SIGNIFICANT CHANGE UP (ref 10–20)
CALCIUM SERPL-MCNC: 9.4 MG/DL — SIGNIFICANT CHANGE UP (ref 8.5–10.1)
CHLORIDE SERPL-SCNC: 106 MMOL/L — SIGNIFICANT CHANGE UP (ref 98–110)
CO2 SERPL-SCNC: 24 MMOL/L — SIGNIFICANT CHANGE UP (ref 17–32)
CREAT SERPL-MCNC: 0.8 MG/DL — SIGNIFICANT CHANGE UP (ref 0.7–1.5)
EOSINOPHIL # BLD AUTO: 0.09 K/UL — SIGNIFICANT CHANGE UP (ref 0–0.7)
EOSINOPHIL NFR BLD AUTO: 1.4 % — SIGNIFICANT CHANGE UP (ref 0–8)
GLUCOSE SERPL-MCNC: 109 MG/DL — HIGH (ref 70–99)
HCT VFR BLD CALC: 41.6 % — LOW (ref 42–52)
HGB BLD-MCNC: 14 G/DL — SIGNIFICANT CHANGE UP (ref 14–18)
IMM GRANULOCYTES NFR BLD AUTO: 0.3 % — SIGNIFICANT CHANGE UP (ref 0.1–0.3)
LYMPHOCYTES # BLD AUTO: 2.86 K/UL — SIGNIFICANT CHANGE UP (ref 1.2–3.4)
LYMPHOCYTES # BLD AUTO: 44.6 % — SIGNIFICANT CHANGE UP (ref 20.5–51.1)
MCHC RBC-ENTMCNC: 31.5 PG — HIGH (ref 27–31)
MCHC RBC-ENTMCNC: 33.7 G/DL — SIGNIFICANT CHANGE UP (ref 32–37)
MCV RBC AUTO: 93.7 FL — SIGNIFICANT CHANGE UP (ref 80–94)
MONOCYTES # BLD AUTO: 0.7 K/UL — HIGH (ref 0.1–0.6)
MONOCYTES NFR BLD AUTO: 10.9 % — HIGH (ref 1.7–9.3)
NEUTROPHILS # BLD AUTO: 2.71 K/UL — SIGNIFICANT CHANGE UP (ref 1.4–6.5)
NEUTROPHILS NFR BLD AUTO: 42.3 % — SIGNIFICANT CHANGE UP (ref 42.2–75.2)
NRBC # BLD: 0 /100 WBCS — SIGNIFICANT CHANGE UP (ref 0–0)
NT-PROBNP SERPL-SCNC: 1438 PG/ML — HIGH (ref 0–300)
PLATELET # BLD AUTO: 181 K/UL — SIGNIFICANT CHANGE UP (ref 130–400)
POTASSIUM SERPL-MCNC: 4.1 MMOL/L — SIGNIFICANT CHANGE UP (ref 3.5–5)
POTASSIUM SERPL-SCNC: 4.1 MMOL/L — SIGNIFICANT CHANGE UP (ref 3.5–5)
PROT SERPL-MCNC: 6.8 G/DL — SIGNIFICANT CHANGE UP (ref 6–8)
RBC # BLD: 4.44 M/UL — LOW (ref 4.7–6.1)
RBC # FLD: 13.5 % — SIGNIFICANT CHANGE UP (ref 11.5–14.5)
SODIUM SERPL-SCNC: 142 MMOL/L — SIGNIFICANT CHANGE UP (ref 135–146)
TROPONIN T SERPL-MCNC: <0.01 NG/ML — SIGNIFICANT CHANGE UP
WBC # BLD: 6.41 K/UL — SIGNIFICANT CHANGE UP (ref 4.8–10.8)
WBC # FLD AUTO: 6.41 K/UL — SIGNIFICANT CHANGE UP (ref 4.8–10.8)

## 2020-08-05 PROCEDURE — 93010 ELECTROCARDIOGRAM REPORT: CPT

## 2020-08-05 PROCEDURE — 71045 X-RAY EXAM CHEST 1 VIEW: CPT | Mod: 26

## 2020-08-05 PROCEDURE — 99285 EMERGENCY DEPT VISIT HI MDM: CPT

## 2020-08-05 NOTE — ED PROVIDER NOTE - OBJECTIVE STATEMENT
65 yo male, pmh of htn, hld, cad, presents to ed for cp. started a few days ago, mild, aching, no radiation. Denies fever, chills, sob, le swelling, abd pain.

## 2020-08-05 NOTE — ED PROVIDER NOTE - PHYSICAL EXAMINATION
Physical Exam    Vital Signs: I have reviewed the initial vital signs.  Constitutional: well-nourished, appears stated age, no acute distress  Eyes: Conjunctiva pink, Sclera clear,  Cardiovascular: S1 and S2, regular rate, regular rhythm, well-perfused extremities, radial pulses equal and 2+, pedal pulses 2+ and equal   Respiratory: unlabored respiratory effort, clear to auscultation bilaterally no wheezing, rales and rhonchi  Gastrointestinal: soft, non-tender abdomen, no pulsatile mass, normal bowl sounds  Musculoskeletal: supple neck, no lower extremity edema, no midline tenderness  Integumentary: warm, dry, no rash  Neurologic: awake, alert, nvi

## 2020-08-05 NOTE — ED PROVIDER NOTE - ATTENDING CONTRIBUTION TO CARE
64m h/o cad/stent May 2019, chf (ef 40%), htn, hl p/w CP x 2d. Mid-sternal, intermitt, non-exertional, lasting sev minutes, no specific aggrav/allev factors. No dizziness, diaphoresis, f/c, cough, sob/holliday, nv, abd pain, edema. PMD/Cardio [facility] - has appt next week.    PE:  nad  skin warm, dry  ncat  neck supple  rrr nl s1s2 no mrg  ctab no wrr  abd soft ntnd no palpable masses no rgr  back non-tender no cvat  ext no cce dpi  neuro aaox3 grossly nf exam

## 2020-08-05 NOTE — ED PROVIDER NOTE - CLINICAL SUMMARY MEDICAL DECISION MAKING FREE TEXT BOX
CP, h/o cad w/recent stent may 2020 - ekg no acute ischemia/similar to priors, cxr clear, labs nl - d/w Dr. Monahan, advised pt has 1V disease w/stent, ef 40% 2 recent office echo, ok for discharge to home and may f/u in office james or next week as previously scheduled - all results d/w pt & copies given, strict return precautions discussed, rec outpt f/u as stated

## 2020-08-05 NOTE — ED ADULT NURSE NOTE - OBJECTIVE STATEMENT
Pt comes to ED c/o of chest pain x 2 days with some nausea. Pt reports that he does not currently have chest pain at this time. Pt reports he did have an MI about two months ago. Denies sob, nausea/vomiting.

## 2020-08-05 NOTE — ED PROVIDER NOTE - PATIENT PORTAL LINK FT
You can access the FollowMyHealth Patient Portal offered by [facility] by registering at the following website: [email or web address]. By joining [facility]’s FollowMyHealth portal, you will also be able to view your health information using other applications (apps) compatible with our system.

## 2020-08-05 NOTE — ED ADULT NURSE NOTE - FAMILY HISTORY
Mother  Still living? Unknown  FH: myocardial infarction, Age at diagnosis: Age Unknown  FH: hypertension, Age at diagnosis: Age Unknown

## 2020-08-05 NOTE — ED PROVIDER NOTE - CARE PROVIDER_API CALL
Miguelangel Monahan [facility]  [address] [place] [address]  Phone: [phone]  Fax: [phone]  Follow Up Time: 1-3 Days

## 2020-08-05 NOTE — ED ADULT NURSE NOTE - NSIMPLEMENTINTERV_GEN_ALL_ED
Implemented All Universal Safety Interventions:  [place] to call system. Call bell, personal items and telephone within reach. Instruct patient to call for assistance. Room bathroom lighting operational. Non-slip footwear when patient is off stretcher. Physically safe environment: no spills, clutter or unnecessary equipment. Stretcher in lowest position, wheels locked, appropriate side rails in place.

## 2020-08-06 PROBLEM — I25.10 ATHEROSCLEROTIC HEART DISEASE OF NATIVE CORONARY ARTERY WITHOUT ANGINA PECTORIS: Chronic | Status: ACTIVE | Noted: 2020-05-20

## 2020-08-06 PROBLEM — I10 ESSENTIAL (PRIMARY) HYPERTENSION: Chronic | Status: ACTIVE | Noted: 2020-05-20

## 2020-08-06 PROBLEM — E78.5 HYPERLIPIDEMIA, UNSPECIFIED: Chronic | Status: ACTIVE | Noted: 2020-05-20

## 2021-04-03 ENCOUNTER — EMERGENCY (EMERGENCY)
Facility: HOSPITAL | Age: 66
LOS: 0 days | Discharge: HOME | End: 2021-04-04
Attending: EMERGENCY MEDICINE | Admitting: EMERGENCY MEDICINE
Payer: COMMERCIAL

## 2021-04-03 VITALS
WEIGHT: 154.98 LBS | RESPIRATION RATE: 20 BRPM | HEART RATE: 67 BPM | OXYGEN SATURATION: 98 % | TEMPERATURE: 97 F | DIASTOLIC BLOOD PRESSURE: 71 MMHG | HEIGHT: 68 IN | SYSTOLIC BLOOD PRESSURE: 132 MMHG

## 2021-04-03 DIAGNOSIS — Z79.899 OTHER LONG TERM (CURRENT) DRUG THERAPY: ICD-10-CM

## 2021-04-03 DIAGNOSIS — Z98.890 OTHER SPECIFIED POSTPROCEDURAL STATES: Chronic | ICD-10-CM

## 2021-04-03 DIAGNOSIS — R31.9 HEMATURIA, UNSPECIFIED: ICD-10-CM

## 2021-04-03 DIAGNOSIS — Z79.82 LONG TERM (CURRENT) USE OF ASPIRIN: ICD-10-CM

## 2021-04-03 DIAGNOSIS — I25.10 ATHEROSCLEROTIC HEART DISEASE OF NATIVE CORONARY ARTERY WITHOUT ANGINA PECTORIS: ICD-10-CM

## 2021-04-03 DIAGNOSIS — I10 ESSENTIAL (PRIMARY) HYPERTENSION: ICD-10-CM

## 2021-04-03 DIAGNOSIS — E78.5 HYPERLIPIDEMIA, UNSPECIFIED: ICD-10-CM

## 2021-04-03 PROCEDURE — 99283 EMERGENCY DEPT VISIT LOW MDM: CPT

## 2021-04-04 ENCOUNTER — EMERGENCY (EMERGENCY)
Facility: HOSPITAL | Age: 66
LOS: 0 days | Discharge: HOME | End: 2021-04-04
Attending: STUDENT IN AN ORGANIZED HEALTH CARE EDUCATION/TRAINING PROGRAM | Admitting: STUDENT IN AN ORGANIZED HEALTH CARE EDUCATION/TRAINING PROGRAM
Payer: COMMERCIAL

## 2021-04-04 VITALS
HEIGHT: 68 IN | TEMPERATURE: 98 F | WEIGHT: 160.06 LBS | SYSTOLIC BLOOD PRESSURE: 114 MMHG | RESPIRATION RATE: 18 BRPM | OXYGEN SATURATION: 97 % | HEART RATE: 63 BPM | DIASTOLIC BLOOD PRESSURE: 69 MMHG

## 2021-04-04 VITALS
OXYGEN SATURATION: 98 % | DIASTOLIC BLOOD PRESSURE: 67 MMHG | SYSTOLIC BLOOD PRESSURE: 117 MMHG | RESPIRATION RATE: 18 BRPM | HEART RATE: 67 BPM | TEMPERATURE: 98 F

## 2021-04-04 DIAGNOSIS — Z79.82 LONG TERM (CURRENT) USE OF ASPIRIN: ICD-10-CM

## 2021-04-04 DIAGNOSIS — Z79.899 OTHER LONG TERM (CURRENT) DRUG THERAPY: ICD-10-CM

## 2021-04-04 DIAGNOSIS — I10 ESSENTIAL (PRIMARY) HYPERTENSION: ICD-10-CM

## 2021-04-04 DIAGNOSIS — R31.9 HEMATURIA, UNSPECIFIED: ICD-10-CM

## 2021-04-04 DIAGNOSIS — E78.5 HYPERLIPIDEMIA, UNSPECIFIED: ICD-10-CM

## 2021-04-04 DIAGNOSIS — Z98.890 OTHER SPECIFIED POSTPROCEDURAL STATES: Chronic | ICD-10-CM

## 2021-04-04 DIAGNOSIS — I25.10 ATHEROSCLEROTIC HEART DISEASE OF NATIVE CORONARY ARTERY WITHOUT ANGINA PECTORIS: ICD-10-CM

## 2021-04-04 DIAGNOSIS — I71.02 DISSECTION OF ABDOMINAL AORTA: ICD-10-CM

## 2021-04-04 LAB
ALBUMIN SERPL ELPH-MCNC: 4.8 G/DL — SIGNIFICANT CHANGE UP (ref 3.5–5.2)
ALP SERPL-CCNC: 51 U/L — SIGNIFICANT CHANGE UP (ref 30–115)
ALT FLD-CCNC: 30 U/L — SIGNIFICANT CHANGE UP (ref 0–41)
ANION GAP SERPL CALC-SCNC: 9 MMOL/L — SIGNIFICANT CHANGE UP (ref 7–14)
APPEARANCE UR: ABNORMAL
APPEARANCE UR: ABNORMAL
AST SERPL-CCNC: 23 U/L — SIGNIFICANT CHANGE UP (ref 0–41)
BACTERIA # UR AUTO: NEGATIVE — SIGNIFICANT CHANGE UP
BACTERIA # UR AUTO: NEGATIVE — SIGNIFICANT CHANGE UP
BASOPHILS # BLD AUTO: 0.03 K/UL — SIGNIFICANT CHANGE UP (ref 0–0.2)
BASOPHILS NFR BLD AUTO: 0.3 % — SIGNIFICANT CHANGE UP (ref 0–1)
BILIRUB SERPL-MCNC: 0.7 MG/DL — SIGNIFICANT CHANGE UP (ref 0.2–1.2)
BILIRUB UR-MCNC: NEGATIVE — SIGNIFICANT CHANGE UP
BILIRUB UR-MCNC: NEGATIVE — SIGNIFICANT CHANGE UP
BUN SERPL-MCNC: 19 MG/DL — SIGNIFICANT CHANGE UP (ref 10–20)
CALCIUM SERPL-MCNC: 9.4 MG/DL — SIGNIFICANT CHANGE UP (ref 8.5–10.1)
CHLORIDE SERPL-SCNC: 106 MMOL/L — SIGNIFICANT CHANGE UP (ref 98–110)
CO2 SERPL-SCNC: 24 MMOL/L — SIGNIFICANT CHANGE UP (ref 17–32)
COLOR SPEC: ABNORMAL
COLOR SPEC: ABNORMAL
CREAT SERPL-MCNC: 0.9 MG/DL — SIGNIFICANT CHANGE UP (ref 0.7–1.5)
DIFF PNL FLD: ABNORMAL
DIFF PNL FLD: ABNORMAL
EOSINOPHIL # BLD AUTO: 0.02 K/UL — SIGNIFICANT CHANGE UP (ref 0–0.7)
EOSINOPHIL NFR BLD AUTO: 0.2 % — SIGNIFICANT CHANGE UP (ref 0–8)
EPI CELLS # UR: 0 /HPF — SIGNIFICANT CHANGE UP (ref 0–5)
EPI CELLS # UR: 1 /HPF — SIGNIFICANT CHANGE UP (ref 0–5)
GLUCOSE SERPL-MCNC: 122 MG/DL — HIGH (ref 70–99)
GLUCOSE UR QL: NEGATIVE — SIGNIFICANT CHANGE UP
GLUCOSE UR QL: NEGATIVE — SIGNIFICANT CHANGE UP
HCT VFR BLD CALC: 44 % — SIGNIFICANT CHANGE UP (ref 42–52)
HGB BLD-MCNC: 15 G/DL — SIGNIFICANT CHANGE UP (ref 14–18)
HYALINE CASTS # UR AUTO: 0 /LPF — SIGNIFICANT CHANGE UP (ref 0–7)
HYALINE CASTS # UR AUTO: 64 /LPF — HIGH (ref 0–7)
IMM GRANULOCYTES NFR BLD AUTO: 0.4 % — HIGH (ref 0.1–0.3)
KETONES UR-MCNC: NEGATIVE — SIGNIFICANT CHANGE UP
KETONES UR-MCNC: NEGATIVE — SIGNIFICANT CHANGE UP
LEUKOCYTE ESTERASE UR-ACNC: NEGATIVE — SIGNIFICANT CHANGE UP
LEUKOCYTE ESTERASE UR-ACNC: NEGATIVE — SIGNIFICANT CHANGE UP
LYMPHOCYTES # BLD AUTO: 1.98 K/UL — SIGNIFICANT CHANGE UP (ref 1.2–3.4)
LYMPHOCYTES # BLD AUTO: 20.6 % — SIGNIFICANT CHANGE UP (ref 20.5–51.1)
MCHC RBC-ENTMCNC: 31 PG — SIGNIFICANT CHANGE UP (ref 27–31)
MCHC RBC-ENTMCNC: 34.1 G/DL — SIGNIFICANT CHANGE UP (ref 32–37)
MCV RBC AUTO: 90.9 FL — SIGNIFICANT CHANGE UP (ref 80–94)
MONOCYTES # BLD AUTO: 0.51 K/UL — SIGNIFICANT CHANGE UP (ref 0.1–0.6)
MONOCYTES NFR BLD AUTO: 5.3 % — SIGNIFICANT CHANGE UP (ref 1.7–9.3)
NEUTROPHILS # BLD AUTO: 7.05 K/UL — HIGH (ref 1.4–6.5)
NEUTROPHILS NFR BLD AUTO: 73.2 % — SIGNIFICANT CHANGE UP (ref 42.2–75.2)
NITRITE UR-MCNC: NEGATIVE — SIGNIFICANT CHANGE UP
NITRITE UR-MCNC: NEGATIVE — SIGNIFICANT CHANGE UP
NRBC # BLD: 0 /100 WBCS — SIGNIFICANT CHANGE UP (ref 0–0)
PH UR: 6 — SIGNIFICANT CHANGE UP (ref 5–8)
PH UR: 6.5 — SIGNIFICANT CHANGE UP (ref 5–8)
PLATELET # BLD AUTO: 192 K/UL — SIGNIFICANT CHANGE UP (ref 130–400)
POTASSIUM SERPL-MCNC: 4.6 MMOL/L — SIGNIFICANT CHANGE UP (ref 3.5–5)
POTASSIUM SERPL-SCNC: 4.6 MMOL/L — SIGNIFICANT CHANGE UP (ref 3.5–5)
PROT SERPL-MCNC: 7.5 G/DL — SIGNIFICANT CHANGE UP (ref 6–8)
PROT UR-MCNC: ABNORMAL
PROT UR-MCNC: ABNORMAL
RBC # BLD: 4.84 M/UL — SIGNIFICANT CHANGE UP (ref 4.7–6.1)
RBC # FLD: 12.7 % — SIGNIFICANT CHANGE UP (ref 11.5–14.5)
RBC CASTS # UR COMP ASSIST: >720 /HPF — HIGH (ref 0–4)
RBC CASTS # UR COMP ASSIST: >720 /HPF — HIGH (ref 0–4)
SODIUM SERPL-SCNC: 139 MMOL/L — SIGNIFICANT CHANGE UP (ref 135–146)
SP GR SPEC: 1.02 — SIGNIFICANT CHANGE UP (ref 1.01–1.03)
SP GR SPEC: 1.02 — SIGNIFICANT CHANGE UP (ref 1.01–1.03)
UROBILINOGEN FLD QL: SIGNIFICANT CHANGE UP
UROBILINOGEN FLD QL: SIGNIFICANT CHANGE UP
WBC # BLD: 9.63 K/UL — SIGNIFICANT CHANGE UP (ref 4.8–10.8)
WBC # FLD AUTO: 9.63 K/UL — SIGNIFICANT CHANGE UP (ref 4.8–10.8)
WBC UR QL: 4 /HPF — SIGNIFICANT CHANGE UP (ref 0–5)
WBC UR QL: 9 /HPF — HIGH (ref 0–5)

## 2021-04-04 PROCEDURE — 99282 EMERGENCY DEPT VISIT SF MDM: CPT

## 2021-04-04 PROCEDURE — 99284 EMERGENCY DEPT VISIT MOD MDM: CPT

## 2021-04-04 PROCEDURE — 74177 CT ABD & PELVIS W/CONTRAST: CPT | Mod: 26

## 2021-04-04 NOTE — ED PROVIDER NOTE - NS ED ROS FT
Constitutional: no fever, chills, no recent weight loss, change in appetite or malaise  Eyes: no redness/discharge/pain/vision changes  ENT: no rhinorrhea/ear pain/sore throat  Cardiac: No chest pain, SOB or edema.  Respiratory: No cough or respiratory distress  GI: No nausea, vomiting, diarrhea or abdominal pain.  : + urinary retention  MS: no pain to back or extremities, no loss of ROM, no weakness  Neuro: No headache or weakness. No LOC.  Skin: No skin rash.  Endocrine: No history of thyroid disease or diabetes.  Except as documented in the HPI, all other systems are negative.

## 2021-04-04 NOTE — ED PROVIDER NOTE - PATIENT PORTAL LINK FT
You can access the FollowMyHealth Patient Portal offered by [facility] by registering at the following website: [email or web address]. By joining [facility]’s FollowMyHealth portal, you will also be able to view your health information using other applications (apps) compatible with our system.

## 2021-04-04 NOTE — ED ADULT NURSE NOTE - OBJECTIVE STATEMENT
pt presents with difficulty urinating with hematuria started last night. pt was seen in the ED and discharged this morning at 6am with no relief of symptoms. Last time patient urinated was at 2am

## 2021-04-04 NOTE — ED PROVIDER NOTE - CLINICAL SUMMARY MEDICAL DECISION MAKING FREE TEXT BOX
Hematuria on clopidogrel without a clear etiology.   Workup ok otherwise. Stable for discharge and close outpatient urology follow up.

## 2021-04-04 NOTE — ED ADULT NURSE NOTE - PRO INTERPRETER NEED 2
Patient was advised of lab result, he verified correct dose of coumadin, reports no changes, nothing new, Patient was advised to continue same dose of coumadin and was given date of next draw   English

## 2021-04-04 NOTE — ED PROVIDER NOTE - PHYSICAL EXAMINATION
VITAL SIGNS: I have reviewed nursing notes and confirm.  CONSTITUTIONAL: well-appearing, non-toxic, NAD  SKIN: Warm dry, normal skin turgor  HEAD: NCAT  EYES: EOMI, PERRLA, no scleral icterus  ENT: Moist mucous membranes, normal pharynx  NECK: Supple; non tender. Full ROM.   CARD: RRR, no murmurs, rubs or gallops  RESP: clear to ausculation b/l.  No rales, rhonchi, or wheezing.  ABD: soft, + BS, non-tender, non-distended, no rebound or guarding. No CVA tenderness  EXT: Full ROM, no bony tenderness, no pedal edema, no calf tenderness  NEURO: normal motor. normal sensory.  Normal gait.  PSYCH: Cooperative, appropriate.

## 2021-04-04 NOTE — ED PROVIDER NOTE - PATIENT PORTAL LINK FT
You can access the FollowMyHealth Patient Portal offered by [facility] by registering at the following website: [email or web address]. By joining [employer]’s FollowMyHealth portal, you will also be able to view your health information using other applications (apps) compatible with our system.

## 2021-04-04 NOTE — ED PROVIDER NOTE - CARE PLAN
Principal Discharge DX:	Urinary retention   Principal Discharge DX:	Urinary retention  Secondary Diagnosis:	Aortic dissection, abdominal

## 2021-04-04 NOTE — CONSULT NOTE ADULT - ATTENDING COMMENTS
No need for vascular intervention at this time.  Would recommend BP control and Aspirin and Statin.    Will follow the patient in clinic with repeat CTA of aorta.

## 2021-04-04 NOTE — ED ADULT NURSE NOTE - OBJECTIVE STATEMENT
65yr old male presents w/ hematuria that started this morning. pt states the last two days he was unable to urinate. This morning he was urinating pink and it gradually became more red. pt denies pain/trauma to area

## 2021-04-04 NOTE — CONSULT NOTE ADULT - SUBJECTIVE AND OBJECTIVE BOX
ABDIFATAH EID [id number]  65y Male    HPI: 65M PMH of 2005 s/p stents at [facility] unclear exactly how many and which locations, HTN, HLD presenting with urinary retention. Patient presented to the ED 24 hours prior found to have hematuria after which point his stream slowed down prompting his presentation to the ED. Upon further workup in the ED a CTAP was attained to look for potential obstructive etiology. Patient was incidentally found to have a short segment chronic aortic dissection just proximal to iliac bifurcation. Patient does not have have any CP/SOB. No headachess or dizziness. No claudication or resting leg pain.     PAST MEDICAL & SURGICAL HISTORY:  Coronary artery disease  s/p stent May 2020  Hypertension  Dyslipidemia  History of left inguinal hernia repair      MEDICATIONS  (STANDING):    MEDICATIONS  (PRN):  Allergies    No Known Allergies    Intolerances        REVIEW OF SYSTEMS    x[ ] A ten-point review of systems was otherwise negative except as noted.  [ ] Due to altered mental status/intubation, subjective information were not able to be obtained from the patient. History was obtained, to the extent possible, from review of the chart and collateral sources of information.      Vital Signs Last 24 Hrs  T(C): 36.8 (2021 15:55), Max: 36.8 (2021 15:55)  T(F): 98.3 (2021 15:55), Max: 98.3 (2021 15:55)  HR: 67 (2021 15:55) (63 - 67)  BP: 117/67 (2021 15:55) (114/69 - 132/71)  BP(mean): --  RR: 18 (2021 15:55) (18 - 20)  SpO2: 98% (2021 15:55) (97% - 98%)    PHYSICAL EXAM:  GENERAL: NAD, well-appearing  CHEST/LUNG: Clear to auscultation bilaterally  HEART: Regular rate and rhythm  ABDOMEN: Soft, Nontender, Nondistended;   EXTREMITIES:  No clubbing, cyanosis, or edema. Palpable radial and DP pulses bilaterally equal and intact    Labs:  CAPILLARY BLOOD GLUCOSE                              15.0   9.63  )-----------( 192      ( 2021 12:30 )             44.0       Auto Neutrophil %: 73.2 % (21 @ 12:30)  Auto Immature Granulocyte %: 0.4 % (21 @ 12:30)        139  |  106  |  19  ----------------------------<  122<H>  4.6   |  24  |  0.9      Calcium, Total Serum: 9.4 mg/dL (21 @ 12:30)      LFTs:             7.5  | 0.7  | 23       ------------------[51      ( 2021 12:30 )  4.8  | x    | 30          Lipase:x      Amylase:x          Coags:      Urinalysis Basic - ( 2021 12:02 )    Color: Light Orange / Appearance: Turbid / S.022 / pH: x  Gluc: x / Ketone: Negative  / Bili: Negative / Urobili: <2 mg/dL   Blood: x / Protein: 100 mg/dL / Nitrite: Negative   Leuk Esterase: Negative / RBC: >720 /HPF / WBC 4 /HPF   Sq Epi: x / Non Sq Epi: 1 /HPF / Bacteria: Negative    RADIOLOGY & ADDITIONAL STUDIES:  < from: CT Abdomen and Pelvis w/ IV Cont (21 @ 14:11) >    OTHER: Focal chronic short segmentdissection within the distal abdominal aorta, just proximal to the iliac bifurcation. Atherosclerotic calcifications.    IMPRESSION:  1.  Nonspecific hyperdensity within the bladder lumen, possibly hemorrhage. Correlate with urinalysis  2.  Otherwise, no definite CT evidence of an acute abdominopelvic pathology.  < end of copied text >

## 2021-04-04 NOTE — ED ADULT TRIAGE NOTE - CHIEF COMPLAINT QUOTE
Pt c/o being unable to urinate since last night, pt was seen in ED yesterday for bloody urine with clots

## 2021-04-04 NOTE — ED PROVIDER NOTE - OBJECTIVE STATEMENT
65M with hx of CAD on ASA/Plavix p/w bloody urine x few days associated with some clots - denies abdominal pain, no n/v, no bloody stools, no other complaints. urinating normally otherwise. no fevers/chills.

## 2021-04-04 NOTE — ED PROVIDER NOTE - OBJECTIVE STATEMENT
65 mal[place] old M with hx of CAD on asa/plavix presenting to ed with urinary retention. Pt sts last urinated at 2:30am. + abd discomfort. Denies any flank pain, fever, chills, nausea, vomiting.

## 2021-04-04 NOTE — ED PROVIDER NOTE - ATTENDING CONTRIBUTION TO CARE
I personally evaluated the patient. I reviewed the Resident’s or Physician Assistant’s note (as assigned above), and agree with the findings and plan except as documented in my note.  64 yo man with hematuria for a few days and came in now due to some clots.  No fever or pain.  Workup in ED was normal otherwise.  Stable for discharge and outpatient urology follow up.

## 2021-04-04 NOTE — ED PROVIDER NOTE - NSFOLLOWUPINSTRUCTIONS_ED_ALL_ED_FT
Urinary Retention    Acute urinary retention is the temporary inability to urinate. This is a common problem in older men. As men age their prostates become larger and block the flow of urine from the bladder. If you are sent home with a yeboah catheter and a drainage system make sure to keep the drainage bag emptied and lower than your catheter. Keep the yeboah catheter in until you follow up with a urologist.    There are two main types of drainage bags. One is a large bag that usually is used at night. It has a good capacity that will allow you to sleep through the night without having to empty it. The second type is called a leg bag. It has a smaller capacity, so it needs to be emptied more frequently. However, the main advantage is that it can be attached by a leg strap and can go underneath your clothing, allowing you the freedom to move about or leave your home.     SEEK IMMEDIATE MEDICAL CARE IF YOU DEVELOP THE FOLLOWING SYMPTOMS: the catheter stops draining urine, the catheter falls out, abdominal pain, nausea/vomiting, or chills/fever. Urinary Retention    Acute urinary retention is the temporary inability to urinate. This is a common problem in older men. As men age their prostates become larger and block the flow of urine from the bladder. If you are sent home with a yeboah catheter and a drainage system make sure to keep the drainage bag emptied and lower than your catheter. Keep the yeboah catheter in until you follow up with a urologist.    There are two main types of drainage bags. One is a large bag that usually is used at night. It has a good capacity that will allow you to sleep through the night without having to empty it. The second type is called a leg bag. It has a smaller capacity, so it needs to be emptied more frequently. However, the main advantage is that it can be attached by a leg strap and can go underneath your clothing, allowing you the freedom to move about or leave your home.     SEEK IMMEDIATE MEDICAL CARE IF YOU DEVELOP THE FOLLOWING SYMPTOMS: the catheter stops draining urine, the catheter falls out, abdominal pain, nausea/vomiting, or chills/fever.    Aortic dissection happens when there is a tear in the wall of the body's main blood vessel (aorta). The aorta leads out of the heart (ascending aorta), curves around, and then goes down the chest (descending aorta) and into the abdomen to supply arteries with blood. The wall of the aorta has inner and outer layers.  As blood collects along the tear, one part of the aorta continues to carry blood to the body, but blood can also flow into the tear between the layers of the aorta. The torn part of the aorta fills with blood and swells. This can reduce blood flow through the part of the aorta that is still supplying blood to the body. Aortic dissection is a medical emergency.  What are the causes?  This condition is commonly caused by weakening of the artery wall due to high blood pressure. Other causes may include:  An injury, such as from a car crash.A complication from heart surgery or from a diagnostic procedure called coronary catheterization.Weakness of the artery wall due to birth defects that affect the connective tissues, such as Marfan syndrome.In some cases, the cause is not known.  What increases the risk?  The following factors may make you more likely to develop this condition:  Having certain medical conditions, such as:  High blood pressure (hypertension).Hardening and narrowing of the arteries (atherosclerosis).A condition that causes inflammation of blood vessels, such as giant cell arteritis.Having two cusps in the aortic valve instead of three (bicuspid aortic valve).Having a bulge in the wall of the aorta (aortic aneurysm).Being male.Being pregnant.Being older than age 60.Using cocaine.Smoking.Lifting heavy weights or doing other types of strength training (high-intensity resistance training).What are the signs or symptoms?  Signs and symptoms of aortic dissection start suddenly. The most common symptoms are:  Severe chest pain that may feel like tearing, stabbing, or sharp pain.Severe pain that spreads (radiates) to the back, neck, jaw, or abdomen.Severe pain between the shoulder blades in the back.Other symptoms may include:  Trouble breathing.Dizziness or fainting.Sudden weakness on one side of the body.Nausea or vomiting.Trouble swallowing.Coughing up blood.Vomiting blood.Clammy skin.How is this diagnosed?  This condition may be diagnosed based on:  Your symptoms and a physical exam. This may include:  Listening for abnormal blood flow sounds (murmurs) in your chest or abdomen.Checking your pulse in your arms and legs.Checking your blood pressure to see whether it is low, or whether there is a difference between the measurements (readings) from your right arm and left arm.Electrocardiogram (ECG). This test measures the electrical activity in your heart.Chest X-ray.CT scan.MRI.Echocardiogram. This uses sound waves to make images of your heart.Blood tests.How is this treated?  It is important to treat aortic dissection as quickly as possible. Treatment may start as soon as your health care provider thinks that you have aortic dissection. Treatment depends on where the dissection is, how severe it is, and your overall health. Treatment may include:  Medicines to lower your heart rate and blood pressure.Surgery to repair your aorta using artificial material (syntheticgraft).A procedure to insert a stent-graft into the aorta (endovascular procedure). During this procedure:  A long, thin tube (stent) is inserted into an artery near the groin (femoral artery). The stent is moved up to the damaged part of the aorta. The stent is opened to help improve blood flow and prevent future dissection.Your health care provider may refer you to a specialized treatment center.  Follow these instructions at home:  If you had surgery, follow instructions from your health care provider about home care after the procedure.  Activity     Do not lift anything that is heavier than 10 lb (4.5 kg), or the limit that you are told, until your health care provider says that it is safe.Avoid activities that could injure your chest or abdomen. Ask your health care provider what activities are safe for you.After you have recovered, try to stay active. Ask your health care provider what activities are safe for you after recovery.Enroll in cardiac rehabilitation. This is a program that helps to improve your health and well-being. It includes exercise training, education, and counseling to help you recover.Lifestyle     Eat a heart-healthy diet, which includes lots of fresh fruits and vegetables, low-fat (lean) protein, and whole grains.Work with your health care provider to treat any other conditions that you may have, such as obesity, high blood pressure, or diabetes.Do not use any products that contain nicotine or tobacco, such as cigarettes, e-cigarettes, and chewing tobacco. If you need help quitting, ask your health care provider.General instructions     Take over-the-counter and prescription medicines only as told by your health care provider.Talk with your health care provider about how to manage stress.Keep all follow-up visits as told by your health care provider. This is important.Get help right away if you:  Develop any symptoms of aortic dissection after treatment, including severe pain in your chest, back, or abdomen.Have pain in your chest.Have weakness in your arm or leg.Have pain in your abdomen.Have trouble breathing or you develop a cough.Faint.Develop a racing heartbeat (palpitations).These symptoms may represent a serious problem that is an emergency. Do not wait to see if the symptoms will go away. Get medical help right away. Call your local emergency services (911 in the [place] Do not drive yourself to the hospital.     Summary  Aortic dissection happens when there is a tear in the wall of the body's main blood vessel (aorta). It is a medical emergency.The most common symptom is severe pain in the chest or pain that spreads (radiates) to the back, neck, jaw, or abdomen.It is important to treat aortic dissection as quickly as possible. Treatment usually includes medicines and surgery.Take over-the-counter and prescription medicines only as told by your health care provider.This information is not intended to replace advice given to you by your health care provider. Make sure you discuss any questions you have with your health care provider.

## 2021-04-04 NOTE — ED PROVIDER NOTE - CLINICAL SUMMARY MEDICAL DECISION MAKING FREE TEXT BOX
pt presents w/ urinary retention 2/2 hematuria. labs w/ nml renal function, no e/o uti. ctap showing chronic aortic dissection. d/w radiology who states dissection unchanged when compared to previous CT. vascular surg c/s placed who cleared pt and set f/u timeframe. discussed return precautions. will dc w/ uro f/u

## 2021-04-04 NOTE — ED PROVIDER NOTE - CARE PROVIDER_API CALL
Van Haney)  Urology  [address] [facility], [address]  [place] [address]  Phone: [phone]  Fax: [phone]  Follow Up Time:     Daisy Jimenez)  Surgery  [address] [place] [id number]  Phone: [phone]  Fax: [phone]  Follow Up Time:

## 2021-04-04 NOTE — ED PROVIDER NOTE - ATTENDING CONTRIBUTION TO CARE
65M with hx of CAD on ASA/Plavix p/w bloody urine for several days. pt came to ER yesterday and had UA showing gross hematuria. pt states after DC, his stream weakened and he has not been able to urinate for several hours. no abd pain or flank pain. mild suprapubic discomfort. no cp, sob, lightheadedness or syncope.    vss  gen- NAD, aaox3  card-rrr  lungs-ctab, no wheezing or rhonchi  abd-sntnd, mild suprapubic discomfort,  no guarding or rebound, no cvat  neuro- full str/sensation, cn ii-xii grossly intact, normal coordination and gait    bedside sono showing pt in retention  will get basic labs, yeboah, ctap to assess for mass  yeboah, +/- irrigation pending UOP

## 2021-04-04 NOTE — ED PROVIDER NOTE - CARE PROVIDER_API CALL
Van Haney)  Urology  [address] [place] [address], [address]  [place] [address]  Phone: [phone]  Fax: [phone]  Follow Up Time: 4-6 Days

## 2021-04-04 NOTE — CONSULT NOTE ADULT - ASSESSMENT
Assessment  65M with PMH of MI/CAD s/p stents, HTN, HLD presenting with CC of urinary retention, incidentally found to have short segment dissection of aorta just proximal to iliac bifurcation with associated atherosclerotic disease. Patient not complaining of any chest/abdominal pain, pulses in tact and equal bilaterally, blood pressure controlled, hemodynamically stable    Plan  - No acute vascular surgery intervention at this time  - OP f/u with Dr. Jimenez in 1 month  - repeat CTAP in 6-8 weeks  - maintain compliance of current HTN regiment   - d/w vascular surgery fellow Dr. Sanchez

## 2021-04-04 NOTE — ED PROVIDER NOTE - PHYSICAL EXAMINATION
CONSTITUTIONAL: Well-appearing; well-nourished; in no apparent distress.   CARDIOVASCULAR: Normal S1, S2; no murmurs, rubs, or gallops.   RESPIRATORY: Normal chest excursion with respiration; breath sounds clear and equal bilaterally; no wheezes, rhonchi, or rales.  GI/: Normal bowel sounds; mild abd distention. No ttp. NO CVA ttp  MS: No evidence of trauma or deformity.  Normal ROM in all four extremities; non-tender to palpation; distal pulses are normal.   SKIN: Normal for age and race; warm; dry; good turgor; no apparent lesions or exudate.   NEURO/PSYCH: A & O x 4; grossly unremarkable. mood and manner are appropriate.

## 2021-04-04 NOTE — ED PROVIDER NOTE - NS ED ROS FT
Constitutional: See HPI.  Eyes: No visual changes, eye pain or discharge. No Photophobia  ENMT: No hearing changes, pain, discharge or infections. No neck pain or stiffness. No limited ROM  Cardiac: No SOB or edema. No chest pain with exertion.  Respiratory: No cough or respiratory distress.   GI: No nausea, vomiting, diarrhea or abdominal pain.  : see hpi   MS: No myalgia, muscle weakness, joint pain or back pain.  Neuro: No headache or weakness. No LOC.  Skin: No skin rash.  Except as documented in the HPI, all other systems are negative.

## 2021-04-05 PROBLEM — Z00.00 ENCOUNTER FOR PREVENTIVE HEALTH EXAMINATION: Status: ACTIVE | Noted: 2021-04-05

## 2021-04-05 LAB
CULTURE RESULTS: NO GROWTH — SIGNIFICANT CHANGE UP
CULTURE RESULTS: SIGNIFICANT CHANGE UP
SPECIMEN SOURCE: SIGNIFICANT CHANGE UP
SPECIMEN SOURCE: SIGNIFICANT CHANGE UP

## 2021-04-06 ENCOUNTER — APPOINTMENT (OUTPATIENT)
Dept: UROLOGY | Facility: CLINIC | Age: 66
End: 2021-04-06
Payer: COMMERCIAL

## 2021-04-06 VITALS — HEIGHT: 64 IN | TEMPERATURE: 97.3 F | BODY MASS INDEX: 27.31 KG/M2 | WEIGHT: 160 LBS

## 2021-04-06 DIAGNOSIS — Z87.891 PERSONAL HISTORY OF NICOTINE DEPENDENCE: ICD-10-CM

## 2021-04-06 DIAGNOSIS — I10 ESSENTIAL (PRIMARY) HYPERTENSION: ICD-10-CM

## 2021-04-06 DIAGNOSIS — Z95.5 PRESENCE OF CORONARY ANGIOPLASTY IMPLANT AND GRAFT: ICD-10-CM

## 2021-04-06 DIAGNOSIS — I25.2 OLD MYOCARDIAL INFARCTION: ICD-10-CM

## 2021-04-06 DIAGNOSIS — Z78.9 OTHER SPECIFIED HEALTH STATUS: ICD-10-CM

## 2021-04-06 PROCEDURE — 51700 IRRIGATION OF BLADDER: CPT

## 2021-04-06 PROCEDURE — 99204 OFFICE O/P NEW MOD 45 MIN: CPT | Mod: 25

## 2021-04-06 PROCEDURE — A4217: CPT | Mod: NC

## 2021-04-06 PROCEDURE — 99072 ADDL SUPL MATRL&STAF TM PHE: CPT

## 2021-04-06 RX ORDER — LOSARTAN POTASSIUM 100 MG/1
TABLET, FILM COATED ORAL
Refills: 0 | Status: ACTIVE | COMMUNITY

## 2021-04-06 RX ORDER — CARVEDILOL 12.5 MG/1
12.5 TABLET, FILM COATED ORAL
Refills: 0 | Status: ACTIVE | COMMUNITY

## 2021-04-06 RX ORDER — ASPIRIN 81 MG/1
81 TABLET, CHEWABLE ORAL
Refills: 0 | Status: ACTIVE | COMMUNITY

## 2021-04-06 RX ORDER — ATORVASTATIN CALCIUM 80 MG/1
80 TABLET, FILM COATED ORAL
Refills: 0 | Status: ACTIVE | COMMUNITY

## 2021-04-06 RX ORDER — SPIRONOLACTONE 50 MG/1
TABLET ORAL
Refills: 0 | Status: ACTIVE | COMMUNITY

## 2021-04-06 RX ORDER — CLOPIDOGREL 75 MG/1
75 TABLET, FILM COATED ORAL
Refills: 0 | Status: ACTIVE | COMMUNITY

## 2021-04-06 RX ORDER — ESCITALOPRAM OXALATE 10 MG/1
10 TABLET ORAL
Refills: 0 | Status: ACTIVE | COMMUNITY

## 2021-04-06 NOTE — PHYSICAL EXAM
[General Appearance - Well Developed] : well developed [General Appearance - Well Nourished] : well nourished [Normal Appearance] : normal appearance [Well Groomed] : well groomed [General Appearance - In No Acute Distress] : no acute distress [Edema] : no peripheral edema [Respiration, Rhythm And Depth] : normal respiratory rhythm and effort [Exaggerated Use Of Accessory Muscles For Inspiration] : no accessory muscle use [Abdomen Soft] : soft [Abdomen Tenderness] : non-tender [Costovertebral Angle Tenderness] : no ~M costovertebral angle tenderness [Urethral Meatus] : meatus normal [Penis Abnormality] : normal uncircumcised penis [Urinary Bladder Findings] : the bladder was normal on palpation [Scrotum] : the scrotum was normal [Testes Tenderness] : no tenderness of the testes [Testes Mass (___cm)] : there were no testicular masses [Normal Station and Gait] : the gait and station were normal for the patient's age [] : no rash [No Focal Deficits] : no focal deficits [Oriented To Time, Place, And Person] : oriented to person, place, and time [Affect] : the affect was normal [Mood] : the mood was normal [Not Anxious] : not anxious [Femoral Lymph Nodes Enlarged Bilaterally] : femoral [Inguinal Lymph Nodes Enlarged Bilaterally] : inguinal [FreeTextEntry1] : Bag with dark red urine. Catheter in place and draining

## 2021-04-06 NOTE — ASSESSMENT
[FreeTextEntry1] : ABDIFATAH EID is a 65 year old male on AC former smoker who presents for consultation for gross hematuria and acute urinary retention likely secondary to clot retention. Concern for bladder mass vs hemorrhagic BPH\par \par Patient was irrigated today with clot drainage to clear please see separate note.\par \par Urine cytology today\par Follow-up 2 weeks for cystoscopy for further evaluation.  Will start Cipro 500 mg p.o. twice daily day before cystoscopy empirically.  All usage, dosage, mechanism of action, and adverse events reviewed.

## 2021-04-06 NOTE — HISTORY OF PRESENT ILLNESS
[FreeTextEntry1] : ABDIFATAH EID is a 65 year old male who presents for consultation for gross hematuria and acute urinary retention.\par \par Approximately 1 week ago patient experienced 1 episode of dysuria with gross hematuria. Since then he has had difficulty voiding with urinating decreased force of stream with pushing/straining to void. Sometimes having strong guards with very little urine. His hematuria resolved after approximately 1 day and he was still experiencing poor voiding.\par 3 days ago patient had return of hematuria, severe at times, with inability to void. He presented to the hospital where a catheter was placed an approximate 1100 cc of urine was obtained.\par He was discharged and his catheter has been draining hematuric urine since.\par He reports a remote history of renal stones, having passed a stone over 15 years ago. He is unsure of what happened and whether he had surgery for the stone or not.\par Denies flank pain.\par \par CT scan Demonstrated nonspecific hyperdensity within the bladder lumen, possible hemorrhage. No other evidence of acute abdominal pelvic pathology.  CT scan images visualized with patient prostate is moderately enlarged though I do not appreciate significant intravesical lobe.normal kidneys\par \par Denies  PMH of recurrent UTIs.\par Family History: No  malignancies\par Social History: Former cigarette smoker, denies EtOH abuse or illicit drug use.\par \par Old records reviewed\par Labs from 4/4/2021:\par CBC within normal limits\par BMP demonstrates a creatinine of 0.9 with an estimated GFR of 89\par Urine testing demonstrates greater than 720 RBCs\par Culture negative\par \par

## 2021-04-08 ENCOUNTER — NON-APPOINTMENT (OUTPATIENT)
Age: 66
End: 2021-04-08

## 2021-04-08 LAB — URINE CYTOLOGY: NORMAL

## 2021-04-19 ENCOUNTER — APPOINTMENT (OUTPATIENT)
Dept: VASCULAR SURGERY | Facility: CLINIC | Age: 66
End: 2021-04-19
Payer: COMMERCIAL

## 2021-04-19 ENCOUNTER — NON-APPOINTMENT (OUTPATIENT)
Age: 66
End: 2021-04-19

## 2021-04-19 VITALS — TEMPERATURE: 98 F

## 2021-04-19 VITALS — DIASTOLIC BLOOD PRESSURE: 65 MMHG | TEMPERATURE: 98 F | SYSTOLIC BLOOD PRESSURE: 124 MMHG

## 2021-04-19 DIAGNOSIS — I71.02 DISSECTION OF ABDOMINAL AORTA: ICD-10-CM

## 2021-04-19 PROCEDURE — 99214 OFFICE O/P EST MOD 30 MIN: CPT

## 2021-04-19 PROCEDURE — 99072 ADDL SUPL MATRL&STAF TM PHE: CPT

## 2021-04-19 NOTE — ASSESSMENT
[FreeTextEntry1] : The patient is a 66 yo male with an incidental finding of an aortic dissection. The patient presented to the ED with urinary retention. He had a CT scan that showed a focal aortic dissection above the bifurcation. The patient is asymptomatic. there is no evidence of aneurysmal disease.\par \par \par Focal aortic dissection 1.7 cm aorta\par No vascular surgery intervention at this time. \par F/U in 6 months for a repeat aortic duplex.

## 2021-04-19 NOTE — HISTORY OF PRESENT ILLNESS
[FreeTextEntry1] : The patient is a 66 yo male with an incidental finding of an aortic dissection. The patient presented to the ED with urinary retention. He had a CT scan that showed a focal aortic dissection above the bifurcation. The patient is asymptomatic. there is no evidence of aneurysmal disease

## 2021-04-22 ENCOUNTER — APPOINTMENT (OUTPATIENT)
Dept: UROLOGY | Facility: CLINIC | Age: 66
End: 2021-04-22
Payer: COMMERCIAL

## 2021-04-22 DIAGNOSIS — N13.8 BENIGN PROSTATIC HYPERPLASIA WITH LOWER URINARY TRACT SYMPMS: ICD-10-CM

## 2021-04-22 DIAGNOSIS — N40.1 BENIGN PROSTATIC HYPERPLASIA WITH LOWER URINARY TRACT SYMPMS: ICD-10-CM

## 2021-04-22 DIAGNOSIS — R33.9 RETENTION OF URINE, UNSPECIFIED: ICD-10-CM

## 2021-04-22 PROCEDURE — 52000 CYSTOURETHROSCOPY: CPT

## 2021-04-22 PROCEDURE — 99215 OFFICE O/P EST HI 40 MIN: CPT | Mod: 25

## 2021-04-22 PROCEDURE — 99072 ADDL SUPL MATRL&STAF TM PHE: CPT

## 2021-04-22 NOTE — HISTORY OF PRESENT ILLNESS
[FreeTextEntry1] : ABDIFATAH EID is a 65 year old male on AC, asa, former smoker who presents for consultation for gross hematuria and acute urinary retention. s/p cystoscopy (4/22/2021)\par \par Pt is here for diagnostic Cystoscopy. \par Pt reports starting Cipro yesterday. \par Urine Cytology from 4/2021: Atypical findings \par \par Cysto 4/22/21 diffuse erythema, with papillary carpeting especially left trigone, inflammatory polyp vs tumor bladder dome. 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 No other evidence of acute abdominal pelvic pathology.  CT scan images visualized with patient prostate is moderately enlarged though I do not appreciate significant intravesical lobe.normal kidneys\par \par Denies  PMH of recurrent UTIs.\par Family History: No  malignancies\par Social History: Former cigarette smoker, denies EtOH abuse or illicit drug use.\par \par Old records reviewed\par Labs from 4/4/2021:\par CBC within normal limits\par BMP demonstrates a creatinine of 0.9 with an estimated GFR of 89\par Urine testing demonstrates greater than 720 RBCs\par Culture negative\par \par

## 2021-04-22 NOTE — ASSESSMENT
[FreeTextEntry1] : ABDIFATAH EID is a 65 year old male on AC, asa, former smoker who presents for consultation for gross hematuria and acute urinary retention. 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 \par Pt elects to undergo TOV. \par \par -TOV, ER precautions given. \par -TURBT \par \par Discuss risks, benefits and alternatives of the procedure. Discussed risks such as infection, bleeding, injury to the genitourinary tract, injury to ureteral orifices, bladder perforation and need for catheter.\par Potentially higher risk of injuring ureteral orifices for tshi endoscopic procedure given tumor at the trigone, requiring ureteral stent etc

## 2021-04-22 NOTE — ADDENDUM
[FreeTextEntry1] : Patient's note was transcribed with the assistance of a medical scribe under the supervision of Dr. Haney.\par I, Dr. Haney, have reviewed the patient's chart and agree that it aligns with my medical decisions.\par rEic Lewis, our scribe, also served as a chaperone for physical examination purposes.\par \par \par

## 2021-04-29 ENCOUNTER — RESULT REVIEW (OUTPATIENT)
Age: 66
End: 2021-04-29

## 2021-04-29 ENCOUNTER — OUTPATIENT (OUTPATIENT)
Dept: OUTPATIENT SERVICES | Facility: HOSPITAL | Age: 66
LOS: 1 days | Discharge: HOME | End: 2021-04-29
Payer: COMMERCIAL

## 2021-04-29 VITALS
SYSTOLIC BLOOD PRESSURE: 119 MMHG | WEIGHT: 164.91 LBS | TEMPERATURE: 98 F | DIASTOLIC BLOOD PRESSURE: 71 MMHG | HEART RATE: 64 BPM | OXYGEN SATURATION: 97 % | RESPIRATION RATE: 17 BRPM | HEIGHT: 68 IN

## 2021-04-29 DIAGNOSIS — Z01.818 ENCOUNTER FOR OTHER PREPROCEDURAL EXAMINATION: ICD-10-CM

## 2021-04-29 DIAGNOSIS — Z98.890 OTHER SPECIFIED POSTPROCEDURAL STATES: Chronic | ICD-10-CM

## 2021-04-29 DIAGNOSIS — Z90.49 ACQUIRED ABSENCE OF OTHER SPECIFIED PARTS OF DIGESTIVE TRACT: Chronic | ICD-10-CM

## 2021-04-29 DIAGNOSIS — D49.4 NEOPLASM OF UNSPECIFIED BEHAVIOR OF BLADDER: ICD-10-CM

## 2021-04-29 DIAGNOSIS — Z98.61 CORONARY ANGIOPLASTY STATUS: Chronic | ICD-10-CM

## 2021-04-29 LAB
ALBUMIN SERPL ELPH-MCNC: 4.3 G/DL — SIGNIFICANT CHANGE UP (ref 3.5–5.2)
ALP SERPL-CCNC: 49 U/L — SIGNIFICANT CHANGE UP (ref 30–115)
ALT FLD-CCNC: 18 U/L — SIGNIFICANT CHANGE UP (ref 0–41)
ANION GAP SERPL CALC-SCNC: 11 MMOL/L — SIGNIFICANT CHANGE UP (ref 7–14)
APPEARANCE UR: CLEAR — SIGNIFICANT CHANGE UP
APTT BLD: 30.4 SEC — SIGNIFICANT CHANGE UP (ref 27–39.2)
AST SERPL-CCNC: 18 U/L — SIGNIFICANT CHANGE UP (ref 0–41)
BACTERIA # UR AUTO: NEGATIVE — SIGNIFICANT CHANGE UP
BASOPHILS # BLD AUTO: 0.03 K/UL — SIGNIFICANT CHANGE UP (ref 0–0.2)
BASOPHILS NFR BLD AUTO: 0.4 % — SIGNIFICANT CHANGE UP (ref 0–1)
BILIRUB SERPL-MCNC: 0.4 MG/DL — SIGNIFICANT CHANGE UP (ref 0.2–1.2)
BILIRUB UR-MCNC: NEGATIVE — SIGNIFICANT CHANGE UP
BUN SERPL-MCNC: 20 MG/DL — SIGNIFICANT CHANGE UP (ref 10–20)
CALCIUM SERPL-MCNC: 9.3 MG/DL — SIGNIFICANT CHANGE UP (ref 8.5–10.1)
CHLORIDE SERPL-SCNC: 104 MMOL/L — SIGNIFICANT CHANGE UP (ref 98–110)
CO2 SERPL-SCNC: 24 MMOL/L — SIGNIFICANT CHANGE UP (ref 17–32)
COLOR SPEC: YELLOW — SIGNIFICANT CHANGE UP
CREAT SERPL-MCNC: 0.8 MG/DL — SIGNIFICANT CHANGE UP (ref 0.7–1.5)
DIFF PNL FLD: NEGATIVE — SIGNIFICANT CHANGE UP
EOSINOPHIL # BLD AUTO: 0.12 K/UL — SIGNIFICANT CHANGE UP (ref 0–0.7)
EOSINOPHIL NFR BLD AUTO: 1.7 % — SIGNIFICANT CHANGE UP (ref 0–8)
EPI CELLS # UR: 0 /HPF — SIGNIFICANT CHANGE UP (ref 0–5)
GLUCOSE SERPL-MCNC: 95 MG/DL — SIGNIFICANT CHANGE UP (ref 70–99)
GLUCOSE UR QL: NEGATIVE — SIGNIFICANT CHANGE UP
HCT VFR BLD CALC: 40.1 % — LOW (ref 42–52)
HGB BLD-MCNC: 13.9 G/DL — LOW (ref 14–18)
HYALINE CASTS # UR AUTO: 0 /LPF — SIGNIFICANT CHANGE UP (ref 0–7)
IMM GRANULOCYTES NFR BLD AUTO: 0.6 % — HIGH (ref 0.1–0.3)
INR BLD: 1.02 RATIO — SIGNIFICANT CHANGE UP (ref 0.65–1.3)
KETONES UR-MCNC: NEGATIVE — SIGNIFICANT CHANGE UP
LEUKOCYTE ESTERASE UR-ACNC: ABNORMAL
LYMPHOCYTES # BLD AUTO: 2.8 K/UL — SIGNIFICANT CHANGE UP (ref 1.2–3.4)
LYMPHOCYTES # BLD AUTO: 40.4 % — SIGNIFICANT CHANGE UP (ref 20.5–51.1)
MCHC RBC-ENTMCNC: 31.4 PG — HIGH (ref 27–31)
MCHC RBC-ENTMCNC: 34.7 G/DL — SIGNIFICANT CHANGE UP (ref 32–37)
MCV RBC AUTO: 90.7 FL — SIGNIFICANT CHANGE UP (ref 80–94)
MONOCYTES # BLD AUTO: 0.63 K/UL — HIGH (ref 0.1–0.6)
MONOCYTES NFR BLD AUTO: 9.1 % — SIGNIFICANT CHANGE UP (ref 1.7–9.3)
NEUTROPHILS # BLD AUTO: 3.31 K/UL — SIGNIFICANT CHANGE UP (ref 1.4–6.5)
NEUTROPHILS NFR BLD AUTO: 47.8 % — SIGNIFICANT CHANGE UP (ref 42.2–75.2)
NITRITE UR-MCNC: NEGATIVE — SIGNIFICANT CHANGE UP
NRBC # BLD: 0 /100 WBCS — SIGNIFICANT CHANGE UP (ref 0–0)
PH UR: 5.5 — SIGNIFICANT CHANGE UP (ref 5–8)
PLATELET # BLD AUTO: 199 K/UL — SIGNIFICANT CHANGE UP (ref 130–400)
POTASSIUM SERPL-MCNC: 3.7 MMOL/L — SIGNIFICANT CHANGE UP (ref 3.5–5)
POTASSIUM SERPL-SCNC: 3.7 MMOL/L — SIGNIFICANT CHANGE UP (ref 3.5–5)
PROT SERPL-MCNC: 6.8 G/DL — SIGNIFICANT CHANGE UP (ref 6–8)
PROT UR-MCNC: NEGATIVE — SIGNIFICANT CHANGE UP
PROTHROM AB SERPL-ACNC: 11.7 SEC — SIGNIFICANT CHANGE UP (ref 9.95–12.87)
RBC # BLD: 4.42 M/UL — LOW (ref 4.7–6.1)
RBC # FLD: 12.3 % — SIGNIFICANT CHANGE UP (ref 11.5–14.5)
RBC CASTS # UR COMP ASSIST: 2 /HPF — SIGNIFICANT CHANGE UP (ref 0–4)
SODIUM SERPL-SCNC: 139 MMOL/L — SIGNIFICANT CHANGE UP (ref 135–146)
SP GR SPEC: 1.03 — SIGNIFICANT CHANGE UP (ref 1.01–1.03)
UROBILINOGEN FLD QL: SIGNIFICANT CHANGE UP
WBC # BLD: 6.93 K/UL — SIGNIFICANT CHANGE UP (ref 4.8–10.8)
WBC # FLD AUTO: 6.93 K/UL — SIGNIFICANT CHANGE UP (ref 4.8–10.8)
WBC UR QL: 16 /HPF — HIGH (ref 0–5)

## 2021-04-29 PROCEDURE — 71046 X-RAY EXAM CHEST 2 VIEWS: CPT | Mod: 26

## 2021-04-29 PROCEDURE — 93010 ELECTROCARDIOGRAM REPORT: CPT

## 2021-04-29 RX ORDER — ATORVASTATIN CALCIUM 80 MG/1
1 TABLET, FILM COATED ORAL
Qty: 0 | Refills: 0 | DISCHARGE

## 2021-04-29 RX ORDER — FUROSEMIDE 40 MG
1 TABLET ORAL
Qty: 0 | Refills: 0 | DISCHARGE

## 2021-04-29 RX ORDER — POTASSIUM CHLORIDE 20 MEQ
1 PACKET (EA) ORAL
Qty: 0 | Refills: 0 | DISCHARGE

## 2021-04-29 NOTE — H&P PST ADULT - NSICDXPASTSURGICALHX_GEN_ALL_CORE_FT
PAST SURGICAL HISTORY:  History of appendectomy     History of left inguinal hernia repair     S/P coronary angioplasty stent x1 5/2020

## 2021-04-29 NOTE — H&P PST ADULT - HISTORY OF PRESENT ILLNESS
PATIENT DENIES CHEST PAIN, SHORTNESS OF BREATH, PALPITATIONS, COUGHING, FEVER, DYSURIA.  CAN WALK UP 2-3 FLIGHTS OF STEPS WITHOUT SOB.    NO COUGH, FEVER, SORE THROAT, HEADACHE, LOSS OF TASTE OR SMELL. NO KNOWN EXPOSURE TO ANYONE WITH COVID. PATIENT WAS INSTRUCTED TO ISOLATE FROM NOW UNTIL THE SURGERY.    Anesthesia Alert  + Difficult Airway (CLASS IV)  NO--History of neck surgery or radiation  NO--Limited ROM of neck  NO--History of Malignant hyperthermia  NO--Personal or family history of Pseudocholinesterase deficiency  NO--Prior Anesthesia Complication  NO--Latex Allergy  NO--Loose teeth  NO--History of Rheumatoid Arthritis  NO--CLIVE

## 2021-04-29 NOTE — H&P PST ADULT - NSANTHOSAYNRD_GEN_A_CORE
No. CLIVE screening performed.  STOP BANG Legend: 0-2 = LOW Risk; 3-4 = INTERMEDIATE Risk; 5-8 = HIGH Risk

## 2021-04-29 NOTE — H&P PST ADULT - REASON FOR ADMISSION
64 Y/O MALE HERE FOR PRE-ADMISSION SURGICAL TESTING. PATIENT REPORTS HEMATURIA 1 MO AGO. HE ALSO HAD SOME OBSTRUCTION AND BLADDER DISTENTION. HE REQUIRED A FINNEGAN CATHETER  W/U REVEALED + BPH WITH OBSTRUCTION. NOW FOR SCHEDULED PROCEDURE.

## 2021-04-29 NOTE — H&P PST ADULT - NSICDXPASTMEDICALHX_GEN_ALL_CORE_FT
PAST MEDICAL HISTORY:  Anxiety     BPH with urinary obstruction     Coronary artery disease s/p stent May 2020    Dyslipidemia     H/O aortic dissection 1.7CM    Heart attack 5/2020    Hypertension

## 2021-04-29 NOTE — H&P PST ADULT - NSICDXFAMILYHX_GEN_ALL_CORE_FT
FAMILY HISTORY:  Mother  Still living? No  Family history of cerebrovascular accident (CVA) in mother, Age at diagnosis: Age Unknown  FH: hypertension, Age at diagnosis: Age Unknown    
Patient/Caregiver provided printed discharge information.

## 2021-04-30 ENCOUNTER — NON-APPOINTMENT (OUTPATIENT)
Age: 66
End: 2021-04-30

## 2021-04-30 LAB
CULTURE RESULTS: NO GROWTH — SIGNIFICANT CHANGE UP
SPECIMEN SOURCE: SIGNIFICANT CHANGE UP

## 2021-05-10 ENCOUNTER — INPATIENT (INPATIENT)
Facility: HOSPITAL | Age: 66
LOS: 3 days | Discharge: HOME HEALTH | End: 2021-05-14
Payer: COMMERCIAL

## 2021-05-10 VITALS
HEIGHT: 68 IN | SYSTOLIC BLOOD PRESSURE: 128 MMHG | OXYGEN SATURATION: 98 % | RESPIRATION RATE: 20 BRPM | DIASTOLIC BLOOD PRESSURE: 70 MMHG | WEIGHT: 160.06 LBS | HEART RATE: 86 BPM | TEMPERATURE: 99 F

## 2021-05-10 DIAGNOSIS — Z98.890 OTHER SPECIFIED POSTPROCEDURAL STATES: Chronic | ICD-10-CM

## 2021-05-10 DIAGNOSIS — Z98.61 CORONARY ANGIOPLASTY STATUS: Chronic | ICD-10-CM

## 2021-05-10 DIAGNOSIS — Z90.49 ACQUIRED ABSENCE OF OTHER SPECIFIED PARTS OF DIGESTIVE TRACT: Chronic | ICD-10-CM

## 2021-05-10 DIAGNOSIS — E87.2 ACIDOSIS: ICD-10-CM

## 2021-05-10 PROBLEM — I21.9 ACUTE MYOCARDIAL INFARCTION, UNSPECIFIED: Chronic | Status: ACTIVE | Noted: 2021-04-29

## 2021-05-10 PROBLEM — N40.1 BENIGN PROSTATIC HYPERPLASIA WITH LOWER URINARY TRACT SYMPTOMS: Chronic | Status: ACTIVE | Noted: 2021-04-29

## 2021-05-10 PROBLEM — Z86.79 PERSONAL HISTORY OF OTHER DISEASES OF THE CIRCULATORY SYSTEM: Chronic | Status: ACTIVE | Noted: 2021-04-29

## 2021-05-10 LAB
ALBUMIN SERPL ELPH-MCNC: 4.6 G/DL — SIGNIFICANT CHANGE UP (ref 3.5–5.2)
ALP SERPL-CCNC: 56 U/L — SIGNIFICANT CHANGE UP (ref 30–115)
ALT FLD-CCNC: 30 U/L — SIGNIFICANT CHANGE UP (ref 0–41)
ANION GAP SERPL CALC-SCNC: 15 MMOL/L — HIGH (ref 7–14)
APPEARANCE UR: CLEAR — SIGNIFICANT CHANGE UP
APTT BLD: 30 SEC — SIGNIFICANT CHANGE UP (ref 27–39.2)
AST SERPL-CCNC: 25 U/L — SIGNIFICANT CHANGE UP (ref 0–41)
BACTERIA # UR AUTO: NEGATIVE — SIGNIFICANT CHANGE UP
BASE EXCESS BLDV CALC-SCNC: 1.7 MMOL/L — SIGNIFICANT CHANGE UP (ref -2–2)
BASOPHILS # BLD AUTO: 0.04 K/UL — SIGNIFICANT CHANGE UP (ref 0–0.2)
BASOPHILS NFR BLD AUTO: 0.2 % — SIGNIFICANT CHANGE UP (ref 0–1)
BILIRUB SERPL-MCNC: 1.5 MG/DL — HIGH (ref 0.2–1.2)
BILIRUB UR-MCNC: NEGATIVE — SIGNIFICANT CHANGE UP
BUN SERPL-MCNC: 16 MG/DL — SIGNIFICANT CHANGE UP (ref 10–20)
CA-I SERPL-SCNC: 1.23 MMOL/L — SIGNIFICANT CHANGE UP (ref 1.12–1.3)
CALCIUM SERPL-MCNC: 10.1 MG/DL — SIGNIFICANT CHANGE UP (ref 8.5–10.1)
CHLORIDE SERPL-SCNC: 94 MMOL/L — LOW (ref 98–110)
CO2 SERPL-SCNC: 22 MMOL/L — SIGNIFICANT CHANGE UP (ref 17–32)
COLOR SPEC: SIGNIFICANT CHANGE UP
CREAT SERPL-MCNC: 1.1 MG/DL — SIGNIFICANT CHANGE UP (ref 0.7–1.5)
DIFF PNL FLD: NEGATIVE — SIGNIFICANT CHANGE UP
EOSINOPHIL # BLD AUTO: 0.01 K/UL — SIGNIFICANT CHANGE UP (ref 0–0.7)
EOSINOPHIL NFR BLD AUTO: 0 % — SIGNIFICANT CHANGE UP (ref 0–8)
EPI CELLS # UR: 3 /HPF — SIGNIFICANT CHANGE UP (ref 0–5)
GAS PNL BLDV: 135 MMOL/L — LOW (ref 136–145)
GAS PNL BLDV: SIGNIFICANT CHANGE UP
GLUCOSE SERPL-MCNC: 145 MG/DL — HIGH (ref 70–99)
GLUCOSE UR QL: NEGATIVE — SIGNIFICANT CHANGE UP
HCO3 BLDV-SCNC: 25 MMOL/L — SIGNIFICANT CHANGE UP (ref 22–29)
HCT VFR BLD CALC: 44.4 % — SIGNIFICANT CHANGE UP (ref 42–52)
HCT VFR BLDA CALC: 47.6 % — HIGH (ref 34–44)
HGB BLD CALC-MCNC: 15.5 G/DL — SIGNIFICANT CHANGE UP (ref 14–18)
HGB BLD-MCNC: 15.6 G/DL — SIGNIFICANT CHANGE UP (ref 14–18)
HYALINE CASTS # UR AUTO: 1 /LPF — SIGNIFICANT CHANGE UP (ref 0–7)
IMM GRANULOCYTES NFR BLD AUTO: 0.7 % — HIGH (ref 0.1–0.3)
INR BLD: 1.08 RATIO — SIGNIFICANT CHANGE UP (ref 0.65–1.3)
KETONES UR-MCNC: NEGATIVE — SIGNIFICANT CHANGE UP
LACTATE BLDV-MCNC: 3.7 MMOL/L — HIGH (ref 0.5–1.6)
LACTATE SERPL-SCNC: 1.3 MMOL/L — SIGNIFICANT CHANGE UP (ref 0.7–2)
LEUKOCYTE ESTERASE UR-ACNC: ABNORMAL
LIDOCAIN IGE QN: 24 U/L — SIGNIFICANT CHANGE UP (ref 7–60)
LYMPHOCYTES # BLD AUTO: 10.6 % — LOW (ref 20.5–51.1)
LYMPHOCYTES # BLD AUTO: 2.29 K/UL — SIGNIFICANT CHANGE UP (ref 1.2–3.4)
MCHC RBC-ENTMCNC: 31.6 PG — HIGH (ref 27–31)
MCHC RBC-ENTMCNC: 35.1 G/DL — SIGNIFICANT CHANGE UP (ref 32–37)
MCV RBC AUTO: 90.1 FL — SIGNIFICANT CHANGE UP (ref 80–94)
MONOCYTES # BLD AUTO: 1.85 K/UL — HIGH (ref 0.1–0.6)
MONOCYTES NFR BLD AUTO: 8.6 % — SIGNIFICANT CHANGE UP (ref 1.7–9.3)
NEUTROPHILS # BLD AUTO: 17.22 K/UL — HIGH (ref 1.4–6.5)
NEUTROPHILS NFR BLD AUTO: 79.9 % — HIGH (ref 42.2–75.2)
NITRITE UR-MCNC: NEGATIVE — SIGNIFICANT CHANGE UP
NRBC # BLD: 0 /100 WBCS — SIGNIFICANT CHANGE UP (ref 0–0)
PCO2 BLDV: 35 MMHG — LOW (ref 41–51)
PH BLDV: 7.46 — HIGH (ref 7.26–7.43)
PH UR: 8 — SIGNIFICANT CHANGE UP (ref 5–8)
PLATELET # BLD AUTO: 211 K/UL — SIGNIFICANT CHANGE UP (ref 130–400)
PO2 BLDV: 54 MMHG — HIGH (ref 20–40)
POTASSIUM BLDV-SCNC: 4.5 MMOL/L — SIGNIFICANT CHANGE UP (ref 3.3–5.6)
POTASSIUM SERPL-MCNC: 4.6 MMOL/L — SIGNIFICANT CHANGE UP (ref 3.5–5)
POTASSIUM SERPL-SCNC: 4.6 MMOL/L — SIGNIFICANT CHANGE UP (ref 3.5–5)
PROT SERPL-MCNC: 7.2 G/DL — SIGNIFICANT CHANGE UP (ref 6–8)
PROT UR-MCNC: SIGNIFICANT CHANGE UP
PROTHROM AB SERPL-ACNC: 12.4 SEC — SIGNIFICANT CHANGE UP (ref 9.95–12.87)
RBC # BLD: 4.93 M/UL — SIGNIFICANT CHANGE UP (ref 4.7–6.1)
RBC # FLD: 12.4 % — SIGNIFICANT CHANGE UP (ref 11.5–14.5)
RBC CASTS # UR COMP ASSIST: 5 /HPF — HIGH (ref 0–4)
SAO2 % BLDV: 89 % — SIGNIFICANT CHANGE UP
SARS-COV-2 RNA SPEC QL NAA+PROBE: SIGNIFICANT CHANGE UP
SODIUM SERPL-SCNC: 131 MMOL/L — LOW (ref 135–146)
SP GR SPEC: 1.02 — SIGNIFICANT CHANGE UP (ref 1.01–1.03)
UROBILINOGEN FLD QL: SIGNIFICANT CHANGE UP
WBC # BLD: 21.57 K/UL — HIGH (ref 4.8–10.8)
WBC # FLD AUTO: 21.57 K/UL — HIGH (ref 4.8–10.8)
WBC UR QL: 78 /HPF — HIGH (ref 0–5)

## 2021-05-10 PROCEDURE — 99285 EMERGENCY DEPT VISIT HI MDM: CPT | Mod: CS

## 2021-05-10 PROCEDURE — 93010 ELECTROCARDIOGRAM REPORT: CPT

## 2021-05-10 PROCEDURE — 71045 X-RAY EXAM CHEST 1 VIEW: CPT | Mod: 26

## 2021-05-10 PROCEDURE — 74177 CT ABD & PELVIS W/CONTRAST: CPT | Mod: 26,MA

## 2021-05-10 RX ORDER — CARVEDILOL PHOSPHATE 80 MG/1
3.12 CAPSULE, EXTENDED RELEASE ORAL
Refills: 0 | Status: DISCONTINUED | OUTPATIENT
Start: 2021-05-10 | End: 2021-05-14

## 2021-05-10 RX ORDER — METRONIDAZOLE 500 MG
500 TABLET ORAL ONCE
Refills: 0 | Status: COMPLETED | OUTPATIENT
Start: 2021-05-10 | End: 2021-05-10

## 2021-05-10 RX ORDER — LOSARTAN POTASSIUM 100 MG/1
25 TABLET, FILM COATED ORAL DAILY
Refills: 0 | Status: DISCONTINUED | OUTPATIENT
Start: 2021-05-10 | End: 2021-05-14

## 2021-05-10 RX ORDER — TAMSULOSIN HYDROCHLORIDE 0.4 MG/1
0.4 CAPSULE ORAL AT BEDTIME
Refills: 0 | Status: DISCONTINUED | OUTPATIENT
Start: 2021-05-10 | End: 2021-05-14

## 2021-05-10 RX ORDER — SPIRONOLACTONE 25 MG/1
25 TABLET, FILM COATED ORAL DAILY
Refills: 0 | Status: DISCONTINUED | OUTPATIENT
Start: 2021-05-10 | End: 2021-05-14

## 2021-05-10 RX ORDER — HEPARIN SODIUM 5000 [USP'U]/ML
5000 INJECTION INTRAVENOUS; SUBCUTANEOUS EVERY 8 HOURS
Refills: 0 | Status: DISCONTINUED | OUTPATIENT
Start: 2021-05-10 | End: 2021-05-14

## 2021-05-10 RX ORDER — SODIUM CHLORIDE 9 MG/ML
1000 INJECTION, SOLUTION INTRAVENOUS ONCE
Refills: 0 | Status: COMPLETED | OUTPATIENT
Start: 2021-05-10 | End: 2021-05-10

## 2021-05-10 RX ORDER — SODIUM CHLORIDE 9 MG/ML
1000 INJECTION, SOLUTION INTRAVENOUS
Refills: 0 | Status: DISCONTINUED | OUTPATIENT
Start: 2021-05-10 | End: 2021-05-10

## 2021-05-10 RX ORDER — CLOPIDOGREL BISULFATE 75 MG/1
75 TABLET, FILM COATED ORAL DAILY
Refills: 0 | Status: DISCONTINUED | OUTPATIENT
Start: 2021-05-10 | End: 2021-05-14

## 2021-05-10 RX ORDER — ATORVASTATIN CALCIUM 80 MG/1
80 TABLET, FILM COATED ORAL AT BEDTIME
Refills: 0 | Status: DISCONTINUED | OUTPATIENT
Start: 2021-05-10 | End: 2021-05-14

## 2021-05-10 RX ORDER — SODIUM CHLORIDE 9 MG/ML
1000 INJECTION INTRAMUSCULAR; INTRAVENOUS; SUBCUTANEOUS
Refills: 0 | Status: DISCONTINUED | OUTPATIENT
Start: 2021-05-10 | End: 2021-05-11

## 2021-05-10 RX ORDER — ACETAMINOPHEN 500 MG
975 TABLET ORAL ONCE
Refills: 0 | Status: COMPLETED | OUTPATIENT
Start: 2021-05-10 | End: 2021-05-10

## 2021-05-10 RX ORDER — ESCITALOPRAM OXALATE 10 MG/1
5 TABLET, FILM COATED ORAL DAILY
Refills: 0 | Status: DISCONTINUED | OUTPATIENT
Start: 2021-05-10 | End: 2021-05-14

## 2021-05-10 RX ORDER — ACETAMINOPHEN 500 MG
650 TABLET ORAL EVERY 6 HOURS
Refills: 0 | Status: DISCONTINUED | OUTPATIENT
Start: 2021-05-10 | End: 2021-05-14

## 2021-05-10 RX ORDER — ENOXAPARIN SODIUM 100 MG/ML
40 INJECTION SUBCUTANEOUS DAILY
Refills: 0 | Status: DISCONTINUED | OUTPATIENT
Start: 2021-05-10 | End: 2021-05-10

## 2021-05-10 RX ORDER — CHLORHEXIDINE GLUCONATE 213 G/1000ML
1 SOLUTION TOPICAL
Refills: 0 | Status: DISCONTINUED | OUTPATIENT
Start: 2021-05-10 | End: 2021-05-14

## 2021-05-10 RX ORDER — ASPIRIN/CALCIUM CARB/MAGNESIUM 324 MG
81 TABLET ORAL DAILY
Refills: 0 | Status: DISCONTINUED | OUTPATIENT
Start: 2021-05-10 | End: 2021-05-14

## 2021-05-10 RX ORDER — CEFEPIME 1 G/1
2000 INJECTION, POWDER, FOR SOLUTION INTRAMUSCULAR; INTRAVENOUS ONCE
Refills: 0 | Status: COMPLETED | OUTPATIENT
Start: 2021-05-10 | End: 2021-05-10

## 2021-05-10 RX ORDER — ONDANSETRON 8 MG/1
4 TABLET, FILM COATED ORAL ONCE
Refills: 0 | Status: COMPLETED | OUTPATIENT
Start: 2021-05-10 | End: 2021-05-10

## 2021-05-10 RX ADMIN — SODIUM CHLORIDE 1000 MILLILITER(S): 9 INJECTION, SOLUTION INTRAVENOUS at 08:45

## 2021-05-10 RX ADMIN — CEFEPIME 100 MILLIGRAM(S): 1 INJECTION, POWDER, FOR SOLUTION INTRAMUSCULAR; INTRAVENOUS at 10:59

## 2021-05-10 RX ADMIN — SODIUM CHLORIDE 1000 MILLILITER(S): 9 INJECTION, SOLUTION INTRAVENOUS at 07:40

## 2021-05-10 RX ADMIN — Medication 81 MILLIGRAM(S): at 17:17

## 2021-05-10 RX ADMIN — CEFEPIME 2000 MILLIGRAM(S): 1 INJECTION, POWDER, FOR SOLUTION INTRAMUSCULAR; INTRAVENOUS at 11:28

## 2021-05-10 RX ADMIN — CLOPIDOGREL BISULFATE 75 MILLIGRAM(S): 75 TABLET, FILM COATED ORAL at 17:17

## 2021-05-10 RX ADMIN — SODIUM CHLORIDE 60 MILLILITER(S): 9 INJECTION INTRAMUSCULAR; INTRAVENOUS; SUBCUTANEOUS at 22:00

## 2021-05-10 RX ADMIN — TAMSULOSIN HYDROCHLORIDE 0.4 MILLIGRAM(S): 0.4 CAPSULE ORAL at 21:57

## 2021-05-10 RX ADMIN — ONDANSETRON 4 MILLIGRAM(S): 8 TABLET, FILM COATED ORAL at 07:37

## 2021-05-10 RX ADMIN — ATORVASTATIN CALCIUM 80 MILLIGRAM(S): 80 TABLET, FILM COATED ORAL at 21:57

## 2021-05-10 RX ADMIN — HEPARIN SODIUM 5000 UNIT(S): 5000 INJECTION INTRAVENOUS; SUBCUTANEOUS at 21:57

## 2021-05-10 RX ADMIN — Medication 975 MILLIGRAM(S): at 09:16

## 2021-05-10 RX ADMIN — SODIUM CHLORIDE 1000 MILLILITER(S): 9 INJECTION, SOLUTION INTRAVENOUS at 09:15

## 2021-05-10 RX ADMIN — Medication 975 MILLIGRAM(S): at 07:37

## 2021-05-10 RX ADMIN — SODIUM CHLORIDE 1000 MILLILITER(S): 9 INJECTION, SOLUTION INTRAVENOUS at 10:50

## 2021-05-10 RX ADMIN — SODIUM CHLORIDE 60 MILLILITER(S): 9 INJECTION, SOLUTION INTRAVENOUS at 13:36

## 2021-05-10 RX ADMIN — SODIUM CHLORIDE 60 MILLILITER(S): 9 INJECTION INTRAMUSCULAR; INTRAVENOUS; SUBCUTANEOUS at 15:51

## 2021-05-10 RX ADMIN — ESCITALOPRAM OXALATE 5 MILLIGRAM(S): 10 TABLET, FILM COATED ORAL at 17:17

## 2021-05-10 RX ADMIN — Medication 650 MILLIGRAM(S): at 15:50

## 2021-05-10 RX ADMIN — Medication 100 MILLIGRAM(S): at 11:28

## 2021-05-10 NOTE — H&P ADULT - ASSESSMENT
65 year old male with a hx of HTN, HLD, CAD s/p stent (May 2020, anxiety, BPH, bladder mass, urinary retention, recent Saldivar admitted for prostatitis.     #Acute Prostatitis   #hx of bladder Mass  #BPH   #Hx of Urinary Retention   -WBC 21, Tmax 101.1 F  -CT A/P: enlarged boggy prostate with increased hypodensity likely reflecting edema since 4/4/2021Findings likely reflect prostatitis in the appropriate clinical setting. Concern for early abscess on the right. Nodule bladder wall lesion along the base/left neck concerning for neoplasia.   -Was given 2L bolus of LR, Cefepime, Levaquin, Flagyl in the ED  -c/w Levaquin QD  - ID consult   - f/u UA, Ucx, blood Cx   - Urology on board  - starting flomax  - bladder US with prevoid and post void residual   - no intervention per Urology  - f/u OP with Dr. Haney, will likely have cystoscopy rescheduled     #lactic Acidosis   #HAGMA   #Hyponatremia/ Hypochloremia   -lactate 3.7  - s/p 2L of LR in ED  - c/w NS 60cc/hr   - f/u UA   - f/u repeat lactate, BMP     #CAD s/p Stent (May 2020)  - c/w aspirin 81mg QD, plavix 75mg QD    #Chronic HFrEF  - EF 25-30%, GII DD  - c/w carvedilol   - c/w Spironolactone     #HLD  - c/w atorvastatin 80mg     #HTN   - controlled   - c/w Losartan 25mg QD     #Anxiety   - c/w Lexapro 5mg QD    #DVT PPX: Lovenox  #GI PPX: not indicated  #Diet: DASH  #Activity as tolerated  #Dispo: acute      65 year old male with a hx of HTN, HLD, CAD s/p stent (May 2020, anxiety, BPH, bladder mass, urinary retention, recent Saldivar admitted for prostatitis.     #Acute Prostatitis   #hx of bladder Mass  #BPH   #Hx of Urinary Retention   -WBC 21, Tmax 101.1 F  -CT A/P: enlarged boggy prostate with increased hypodensity likely reflecting edema since 4/4/2021Findings likely reflect prostatitis in the appropriate clinical setting. Concern for early abscess on the right. Nodule bladder wall lesion along the base/left neck concerning for neoplasia.   -Was given 2L bolus of LR, Cefepime, Levaquin, Flagyl in the ED  -c/w Levaquin QD  - ID consult   - f/u UA, Ucx, blood Cx   - Urology on board  - starting flomax  - bladder US with prevoid and post void residual   - no intervention per Urology  - f/u OP with Dr. Haney, will likely have cystoscopy rescheduled     #lactic Acidosis   #HAGMA   #Hyponatremia/ Hypochloremia   -lactate 3.7  - s/p 2L of LR in ED  - c/w NS 60cc/hr   - f/u UA   - f/u repeat lactate, BMP     #CAD s/p Stent (May 2020)  - c/w aspirin 81mg QD, plavix 75mg QD/statin    #Chronic HFrEF/Chronic systolic CHF/ICMP  - EF 25-30%, GII DD  - c/w carvedilol   - c/w Spironolactone   pt also on losartan as outpt. but being withheld 2/2 hypotension.    #HLD  - c/w atorvastatin 80mg     #HTN   - controlled   - c/w Losartan 25mg QD once BP better    #Anxiety   - c/w Lexapro 5mg QD    #DVT PPX: Lovenox  #GI PPX: not indicated  #Diet: DASH  #Activity as tolerated  #Dispo: acute

## 2021-05-10 NOTE — H&P ADULT - HISTORY OF PRESENT ILLNESS
65 year old male with a hx of HTN, HLD, CAD s/p stent (May 2020, anxiety, HFrEF (EF25-30%, GII DD), BPH, bladder mass, urinary retention, recent Saldivar use presented to the ED do to difficulty with urination and subjective fevers. He began having difficulty with urination over the past two days which has been associated with fevers, frequency, and urgency. Fevers began last night with a T max of 101 so he presented to the ED. He was recently treated with cipro for cystitis in April. He has been scheduled for an outpatient cystoscopy on 5/14/21 with Dr. Haney. He denies any suprapubic pain, back/ flank pain, dysuria, foul smelling urine, rectal pain, or hematuria. He denies any sick contacts, cough, chest pain, difficulty breathing, or changes in bowel movements   In ED VITALS Temp 101.1 (tmax),rest of vitals WNL. WBC 21. lactate 3.7. Urology was consulted. CT A/P: enlarged boggy prostate with increased hypodensity likely reflecting edema since 4/4/2021Findings likely reflect prostatitis in the appropriate clinical setting. Concern for early abscess on the right. Nodule bladder wall lesion along the base/left neck concerning for neoplasia. Was given 2L bolus of LR, Cefepime, Levaquin, Flagyl in the ED. No intervention by urology. Patient is being admitted for Acute prostatitis.

## 2021-05-10 NOTE — ED PROVIDER NOTE - OBJECTIVE STATEMENT
66 yo M with PMH bladder mass, urinary retention, CAD who presents with difficulty urinating x2 days associated with fevers.  Tmax 101 last night, Tylenol taken last night.  Pt began having fever last night which prompted him to come to ER.  He has has increased urinary frequency but only able to initiate few drops.  Pt seen by Lyndon and has procedure 5/14 to remove bladder mass.  Pt denies any dysuria, hematuria, diarrhea, constipation, abdominal pain, chest pain, SOB, rash.

## 2021-05-10 NOTE — ED ADULT NURSE NOTE - PMH
Anxiety    BPH with urinary obstruction    Coronary artery disease  s/p stent May 2020  Dyslipidemia    H/O aortic dissection  1.7CM  Heart attack  5/2020  Hypertension

## 2021-05-10 NOTE — ED ADULT TRIAGE NOTE - CHIEF COMPLAINT QUOTE
Pt states he doesn't 't fully emptying his bladder/ dribble Pt states he doesn't 't fully emptying his bladder/ dribble small amount

## 2021-05-10 NOTE — CONSULT NOTE ADULT - ASSESSMENT
65 y.o M with PMH of BPH and known bladder mass for which he is scheduled to go for cystoscopy TURBT 5/14/21 presents to ED today with c/o of difficulty urinating associated with fever, frequency and urgency. Able to void in ED.   Urology called to evaluate patient for parostitis  CT A/P findings of enlarged boggy prostate with increased hypodensity likely reflecting edema since 4/4/2021. Findings likely reflect prostatitis in the appropriate clinical setting. Concern for early abscess on the right. Nodule bladder wall lesion along the base/left neck concerning for neoplasia.      Plan:  No acute  intervention needed at this time   F/U UA, Urine cx   Cont. IV Abx as per ID  Flomax   Please obtain Bladder US to evaluate pre-void and post-void residual  Case d/w Dr. Haney , Pt. will be re-scheduled for outpatient procedure once medically stable and infection free.

## 2021-05-10 NOTE — ED PROVIDER NOTE - PHYSICAL EXAMINATION
CONSTITUTIONAL: Well-developed; well-nourished; tired appearing  SKIN: Warm, dry  EYES: No conjunctival injection. EOMI  ENT: No nasal discharge; oropharynx nonerythematous; airway clear  CARD:  Regular rate and rhythm  RESP: CTAB  ABD: Soft NTND; No guarding or rebound tenderness; No flank or CVA tenderness; No geiger's sign  EXT: Normal ROM.  No edema  NEURO: A&O x3, grossly unremarkable, no focal deficits  PSYCH: Cooperative, appropriate

## 2021-05-10 NOTE — ED PROVIDER NOTE - ATTENDING CONTRIBUTION TO CARE
65 M to ED with urin frequency, urgency, retention   presents with 2x of sx and intermittent fevers. No sick contacts, no travels  h/o bladder mass with plan for Bx. No trauma or fall.  AVSS, exam as noted, CTAB, RRR, abdomen soft NTND,

## 2021-05-10 NOTE — H&P ADULT - NSHPLABSRESULTS_GEN_ALL_CORE
VITALS:   T(F): 99.4  HR: 73  BP: 109/66  RR: 20  SpO2: 96%    LABS:                        15.6   21.57 )-----------( 211      ( 10 May 2021 07:38 )             44.4     05-10    131<L>  |  94<L>  |  16  ----------------------------<  145<H>  4.6   |  22  |  1.1    Ca    10.1      10 May 2021 07:38    TPro  7.2  /  Alb  4.6  /  TBili  1.5<H>  /  DBili  x   /  AST  25  /  ALT  30  /  AlkPhos  56  05-10    PT/INR - ( 10 May 2021 07:38 )   PT: 12.40 sec;   INR: 1.08 ratio         PTT - ( 10 May 2021 07:38 )  PTT:30.0 sec      Lactate, Blood: 1.3 mmol/L (05-10-21 @ 11:50)

## 2021-05-10 NOTE — ED ADULT NURSE NOTE - OBJECTIVE STATEMENT
pt65 y/o male presents c/o unable to completed empty bladder states he is due for bladder tumor removal 5/14

## 2021-05-10 NOTE — PATIENT PROFILE ADULT - PRO INTERPRETER NEED 2
English Spironolactone Counseling: Patient advised regarding risks of diarrhea, abdominal pain, hyperkalemia, birth defects (for female patients), liver toxicity and renal toxicity. The patient may need blood work to monitor liver and kidney function and potassium levels while on therapy. The patient verbalized understanding of the proper use and possible adverse effects of spironolactone.  All of the patient's questions and concerns were addressed.

## 2021-05-10 NOTE — ED PROVIDER NOTE - NS ED ROS FT
Review of Systems:  CONSTITUTIONAL: + fever, No diaphoresis  SKIN: No rash  EYES: No eye pain, No blurred vision  ENT: No sore throat, No neck pain, No rhinorrhea  RESPIRATORY: No shortness of breath, No cough  CARDIAC: No chest pain, No palpitations  GI: No abdominal pain, No nausea, + vomiting, No diarrhea, No constipation, No bright red blood per rectum or melena. No flank pain  : No dysuria, + frequency, + retention, No hematuria  MUSCULOSKELETAL: No joint paint, No swelling, No back pain  NEUROLOGIC: No numbness, No focal weakness, No headache, No dizziness  All other systems negative, unless specified in HPI

## 2021-05-10 NOTE — CONSULT NOTE ADULT - SUBJECTIVE AND OBJECTIVE BOX
Patient is a 65y old  Male who presents with a chief complaint of urinary urgency and frequency.    HPI:  66 yo M with PMH of HTN, HLD, CAD, h/o MI (May, 2020) s/p cardiac stent placement, aortic discretion, anxiety, BPH,  bladder mass, urinary retention , recent Saldivar catheter use p/w with difficulty urinating x 2 days associated with fevers, frequency and urgency. Tmax 101 last night, Tylenol taken last night.   Pt began having fever last night which prompted him to come to ER. Pt is scheduled 5/14 for cysto, TURBT with Dr. Haney. Urology called to evaluate Pt. for CT A/P findings of enlarged boggy prostate with increased hypodensity likely reflecting edema since 4/4/2021. Findings likely reflect prostatitis in the appropriate clinical setting. Concern for early abscess on the right. Nodule bladder wall lesion along the base/left neck concerning for neoplasia.  Pt. seen and examined at bedside in NAD, states to frequency and urgency but able to urinate more than 200 ml in urinal.   Pt. denies dysuria, hematuria, abdominal or rectal pain.       PAST MEDICAL & SURGICAL HISTORY:  Coronary artery disease  s/p stent May 2020    Hypertension    Dyslipidemia    H/O aortic dissection  1.7CM    Heart attack  5/2020    Anxiety    BPH with urinary obstruction    History of left inguinal hernia repair    S/P coronary angioplasty  stent x1 5/2020    History of appendectomy        REVIEW OF SYSTEMS:  CONSTITUTIONAL:  + fevers, no chills  HEENT: No visual changes  ENDO: No sweating  NECK: No pain or stiffness  MUSCULOSKELETAL: No back pain, no joint pain  RESPIRATORY: No shortness of breath  CARDIOVASCULAR: No chest pain  GASTROINTESTINAL: No abdominal or epigastric pain. No nausea, vomiting,  No diarrhea or constipation.   NEUROLOGICAL: No mental status changes  PSYCH: No depression, no mood changes  SKIN: No itching   as per HPI       Home Medications:  Aspirin Enteric Coated 81 mg oral delayed release tablet: 1 tab(s) orally once a day (29 Apr 2021 18:59)  clopidogrel 75 mg oral tablet: 1 tab(s) orally once a day (29 Apr 2021 18:59)  Lexapro 5 mg oral tablet: 1 tab(s) orally once a day (29 Apr 2021 18:59)  Lipitor 80 mg oral tablet: 1 tab(s) orally once a day (at bedtime) (29 Apr 2021 18:59)  losartan 25 mg oral tablet: 1 tab(s) orally once a day (29 Apr 2021 18:59)  nitroglycerin 0.4 mg sublingual tablet: 1 tab(s) sublingual every 5 minutes, As Needed (29 Apr 2021 18:59)       Allergies: NKDA       SOCIAL HISTORY: No illicit drug use, smoking, ETOH     FAMILY HISTORY:  FH: hypertension (Mother)  Mother    Family history of cerebrovascular accident (CVA) in mother (Mother)        Vital Signs Last 24 Hrs  T(C): 37.4 (10 May 2021 09:16), Max: 38.4 (10 May 2021 07:21)  T(F): 99.4 (10 May 2021 09:16), Max: 101.1 (10 May 2021 07:21)  HR: 73 (10 May 2021 10:05) (73 - 86)  BP: 109/66 (10 May 2021 10:05) (109/66 - 128/70)  RR: 20 (10 May 2021 07:21) (20 - 20)  SpO2: 96% (10 May 2021 07:21) (96% - 98%)     PHYSICAL EXAM:  Constitutional: NAD, well-developed, well nourished   HEENT: NC/AT, EOMI  Neck: no pain  Back: No CVA tenderness B/L   Respiratory: No accessory respiratory muscle use  Abd: Soft, ND, mild ttp over LLQ, palpable bladder    : uncircumcised male genitalia, meatus patent, testis descended B/L no ttp, no edema or erythema.   Extremities: no edema  Neurological: A/O x 3  Psychiatric: Normal mood, normal affect  Skin: No rashes         LABS:                        15.6   21.57 )-----------( 211      ( 10 May 2021 07:38 )             44.4     05-10    131<L>  |  94<L>  |  16  ----------------------------<  145<H>  4.6   |  22  |  1.1    Ca    10.1      10 May 2021 07:38    TPro  7.2  /  Alb  4.6  /  TBili  1.5<H>  /  DBili  x   /  AST  25  /  ALT  30  /  AlkPhos  56  05-10    PT/INR - ( 10 May 2021 07:38 )   PT: 12.40 sec;   INR: 1.08 ratio         PTT - ( 10 May 2021 07:38 )  PTT:30.0 sec       Culture - Urine (04.29.21 @ 19:50)    Specimen Source: .Urine Clean Catch (Midstream)    Culture Results:   No growth      RADIOLOGY & ADDITIONAL STUDIES:     < from: CT Abdomen and Pelvis w/ IV Cont (05.10.21 @ 10:31) >  EXAM:  CT ABDOMEN AND PELVIS IC            PROCEDURE DATE:  05/10/2021        INTERPRETATION:  CLINICAL STATEMENT: Urinary retention.    TECHNIQUE: Contiguous axial CT images were obtained from the lower chest to the pubic symphysis following administration of 100cc Omnipaque 350 intravenous contrast.  Oral contrast was not administered.  Reformatted images in the coronal and sagittal planes were acquired.    COMPARISON: CT ABDOMEN/PELVIS 4/4/2021.    OTHER STUDIES USED FOR CORRELATION: None.      FINDINGS:    LOWER CHEST: Lung base without mass or consolidation. Dependent changes. Prominent left ventricle. Aortic valvular calcifications partially imaged.    HEPATOBILIARY: Unremarkable.    SPLEEN: Unremarkable.    PANCREAS: Unremarkable.    ADRENAL GLANDS: Unremarkable.    KIDNEYS: Symmetric enhancement. No hydronephrosis.    ABDOMINOPELVIC NODES: Unremarkable.    PELVIC ORGANS: Since 4/4/2021, the prostate demonstrates increased size with bilateral hypodensities, more pronounced onthe right. For example, along the peripheral zone on the right measuring approximately 3.8 x 1.8 cm (series 4 image 246) is concerning for an early abscess. There is a 1.8 x 2 x 0.7 cm nodular lesion along the left bladder base/neck.    PERITONEUM/MESENTERY/BOWEL: No bowel obstruction, pneumoperitoneum, pneumatosis or ascites. Colonic diverticulosis.    BONES/SOFT TISSUES: No acute osseous abnormality. Bony degenerative changes.    OTHER: Normal caliber aorta with diffuse atherosclerotic disease.    IMPRESSION:    Enlarged boggy prostate with increased hypodensity likely reflecting edema since 4/4/2021. Findings likely reflect prostatitis in the appropriate clinical setting. Concern for early abscess on the right.    Nodule bladder wall lesion along the base/left neck concerning for neoplasia.  Correlate with history of bladder carcinoma. Direct visualization recommended.    Spoke with DOROTHY MCMILLAN on 5/10/2021 11:23 AM with readback.        CRISTA GAMEZ MD; Attending Radiologist  This document has been electronically signed. May 10 2021 11:24AM    < end of copied text >  
01-[birth year]-2018

## 2021-05-10 NOTE — ED PROVIDER NOTE - PROGRESS NOTE DETAILS
AH - pt had 1 episode emesis, zofran given; Leukocytosis with fever, started antibx; cxr unremarkable; pending remaining labs and imaging  - CT showed prostatitis, Urology consulted, will follow in patient and reschedule his procedure; admission for IV antibx to [facility]

## 2021-05-10 NOTE — H&P ADULT - NSHPPHYSICALEXAM_GEN_ALL_CORE
PHYSICAL EXAM:  GENERAL: NAD, speaks in full sentences, no signs of respiratory distress  HEAD: Atraumatic  NECK: Supple  CHEST/LUNG: Clear to auscultation bilaterally  HEART: S1, S2; RRR; No murmurs, rubs, or gallops  ABDOMEN: BS+; Soft, Non-tender, Non-distended  BACK: no costovertebral tenderness   EXTREMITIES:  2+ Peripheral Pulses  PSYCH: AAOx3  NEUROLOGY: non-focal  SKIN: No rashes or lesions

## 2021-05-10 NOTE — ED ADULT NURSE NOTE - NSIMPLEMENTINTERV_GEN_ALL_ED
Implemented All Fall Risk Interventions:  [place] to call system. Call bell, personal items and telephone within reach. Instruct patient to call for assistance. Room bathroom lighting operational. Non-slip footwear when patient is off stretcher. Physically safe environment: no spills, clutter or unnecessary equipment. Stretcher in lowest position, wheels locked, appropriate side rails in place. Provide visual cue, wrist band, yellow gown, etc. Monitor gait and stability. Monitor for mental status changes and reorient to person, place, and time. Review medications for side effects contributing to fall risk. Reinforce activity limits and safety measures with patient and family.

## 2021-05-10 NOTE — H&P ADULT - NSICDXFAMILYHX_GEN_ALL_CORE_FT
FAMILY HISTORY:  Mother  Still living? No  Family history of cerebrovascular accident (CVA) in mother, Age at diagnosis: Age Unknown  FH: hypertension, Age at diagnosis: Age Unknown

## 2021-05-11 LAB
ALBUMIN SERPL ELPH-MCNC: 4 G/DL — SIGNIFICANT CHANGE UP (ref 3.5–5.2)
ALP SERPL-CCNC: 47 U/L — SIGNIFICANT CHANGE UP (ref 30–115)
ALT FLD-CCNC: 22 U/L — SIGNIFICANT CHANGE UP (ref 0–41)
ANION GAP SERPL CALC-SCNC: 11 MMOL/L — SIGNIFICANT CHANGE UP (ref 7–14)
AST SERPL-CCNC: 18 U/L — SIGNIFICANT CHANGE UP (ref 0–41)
BILIRUB SERPL-MCNC: 1.4 MG/DL — HIGH (ref 0.2–1.2)
BUN SERPL-MCNC: 14 MG/DL — SIGNIFICANT CHANGE UP (ref 10–20)
CALCIUM SERPL-MCNC: 9.1 MG/DL — SIGNIFICANT CHANGE UP (ref 8.5–10.1)
CHLORIDE SERPL-SCNC: 100 MMOL/L — SIGNIFICANT CHANGE UP (ref 98–110)
CO2 SERPL-SCNC: 24 MMOL/L — SIGNIFICANT CHANGE UP (ref 17–32)
COVID-19 SPIKE DOMAIN AB INTERP: NEGATIVE — SIGNIFICANT CHANGE UP
COVID-19 SPIKE DOMAIN ANTIBODY RESULT: 0.4 U/ML — SIGNIFICANT CHANGE UP
CREAT SERPL-MCNC: 1 MG/DL — SIGNIFICANT CHANGE UP (ref 0.7–1.5)
CULTURE RESULTS: NO GROWTH — SIGNIFICANT CHANGE UP
GLUCOSE SERPL-MCNC: 124 MG/DL — HIGH (ref 70–99)
HCT VFR BLD CALC: 39.1 % — LOW (ref 42–52)
HGB BLD-MCNC: 13.4 G/DL — LOW (ref 14–18)
LACTATE SERPL-SCNC: 1.2 MMOL/L — SIGNIFICANT CHANGE UP (ref 0.7–2)
MAGNESIUM SERPL-MCNC: 1.9 MG/DL — SIGNIFICANT CHANGE UP (ref 1.8–2.4)
MCHC RBC-ENTMCNC: 31.3 PG — HIGH (ref 27–31)
MCHC RBC-ENTMCNC: 34.3 G/DL — SIGNIFICANT CHANGE UP (ref 32–37)
MCV RBC AUTO: 91.4 FL — SIGNIFICANT CHANGE UP (ref 80–94)
NRBC # BLD: 0 /100 WBCS — SIGNIFICANT CHANGE UP (ref 0–0)
PLATELET # BLD AUTO: 170 K/UL — SIGNIFICANT CHANGE UP (ref 130–400)
POTASSIUM SERPL-MCNC: 4.2 MMOL/L — SIGNIFICANT CHANGE UP (ref 3.5–5)
POTASSIUM SERPL-SCNC: 4.2 MMOL/L — SIGNIFICANT CHANGE UP (ref 3.5–5)
PROT SERPL-MCNC: 6.3 G/DL — SIGNIFICANT CHANGE UP (ref 6–8)
RBC # BLD: 4.28 M/UL — LOW (ref 4.7–6.1)
RBC # FLD: 12.5 % — SIGNIFICANT CHANGE UP (ref 11.5–14.5)
SARS-COV-2 IGG+IGM SERPL QL IA: 0.4 U/ML — SIGNIFICANT CHANGE UP
SARS-COV-2 IGG+IGM SERPL QL IA: NEGATIVE — SIGNIFICANT CHANGE UP
SODIUM SERPL-SCNC: 135 MMOL/L — SIGNIFICANT CHANGE UP (ref 135–146)
SPECIMEN SOURCE: SIGNIFICANT CHANGE UP
WBC # BLD: 13.99 K/UL — HIGH (ref 4.8–10.8)
WBC # FLD AUTO: 13.99 K/UL — HIGH (ref 4.8–10.8)

## 2021-05-11 PROCEDURE — 76857 US EXAM PELVIC LIMITED: CPT | Mod: 26

## 2021-05-11 PROCEDURE — 99222 1ST HOSP IP/OBS MODERATE 55: CPT

## 2021-05-11 RX ORDER — CEFTRIAXONE 500 MG/1
2000 INJECTION, POWDER, FOR SOLUTION INTRAMUSCULAR; INTRAVENOUS EVERY 24 HOURS
Refills: 0 | Status: DISCONTINUED | OUTPATIENT
Start: 2021-05-11 | End: 2021-05-14

## 2021-05-11 RX ADMIN — HEPARIN SODIUM 5000 UNIT(S): 5000 INJECTION INTRAVENOUS; SUBCUTANEOUS at 06:04

## 2021-05-11 RX ADMIN — LOSARTAN POTASSIUM 25 MILLIGRAM(S): 100 TABLET, FILM COATED ORAL at 06:04

## 2021-05-11 RX ADMIN — ESCITALOPRAM OXALATE 5 MILLIGRAM(S): 10 TABLET, FILM COATED ORAL at 11:13

## 2021-05-11 RX ADMIN — CARVEDILOL PHOSPHATE 3.12 MILLIGRAM(S): 80 CAPSULE, EXTENDED RELEASE ORAL at 06:04

## 2021-05-11 RX ADMIN — CARVEDILOL PHOSPHATE 3.12 MILLIGRAM(S): 80 CAPSULE, EXTENDED RELEASE ORAL at 17:36

## 2021-05-11 RX ADMIN — CEFTRIAXONE 100 MILLIGRAM(S): 500 INJECTION, POWDER, FOR SOLUTION INTRAMUSCULAR; INTRAVENOUS at 17:36

## 2021-05-11 RX ADMIN — Medication 81 MILLIGRAM(S): at 11:13

## 2021-05-11 RX ADMIN — CLOPIDOGREL BISULFATE 75 MILLIGRAM(S): 75 TABLET, FILM COATED ORAL at 11:13

## 2021-05-11 RX ADMIN — HEPARIN SODIUM 5000 UNIT(S): 5000 INJECTION INTRAVENOUS; SUBCUTANEOUS at 21:52

## 2021-05-11 RX ADMIN — TAMSULOSIN HYDROCHLORIDE 0.4 MILLIGRAM(S): 0.4 CAPSULE ORAL at 21:51

## 2021-05-11 RX ADMIN — SPIRONOLACTONE 25 MILLIGRAM(S): 25 TABLET, FILM COATED ORAL at 06:04

## 2021-05-11 RX ADMIN — ATORVASTATIN CALCIUM 80 MILLIGRAM(S): 80 TABLET, FILM COATED ORAL at 21:51

## 2021-05-11 RX ADMIN — HEPARIN SODIUM 5000 UNIT(S): 5000 INJECTION INTRAVENOUS; SUBCUTANEOUS at 13:55

## 2021-05-11 NOTE — PROGRESS NOTE ADULT - ASSESSMENT
65 year old male with a hx of HTN, HLD, CAD s/p stent (May 2020, anxiety, BPH, bladder mass, urinary retention, recent Saldivar admitted for prostatitis.     #Acute Prostatitis   #hx of bladder Mass  #BPH   #Hx of Urinary Retention   # Severe sepsis on admission   -WBC 21 > 13.99, Tmax 101.1 F  -CT A/P: enlarged boggy prostate with increased hypodensity likely reflecting edema since 4/4/2021Findings likely reflect prostatitis in the appropriate clinical setting. Concern for early abscess on the right. Nodule bladder wall lesion along the base/left neck concerning for neoplasia.   -Was given 2L bolus of LR, Cefepime, Levaquin, Flagyl in the ED  - ID eval noted, recs:  BCx   UCx   Rocephin 2 gm iv q24h  - f/u Ucx, blood Cx   - Urology recs:  No acute  intervention needed at this time   F/U UA, Urine cx   Cont. IV Abx as per ID  Flomax   Please obtain Bladder US to evaluate pre-void and post-void residual  Case d/w Dr. Haney , Pt. will be re-scheduled for outpatient procedure once medically stable and infection free.   - c/w flomax  - bladder US: 2.6 cm soft tissue nodule is seen in the urinary bladder base. Differential diagnosis includes BPH nodule, cannot exclude urothelial lesion. Prostate volume 37 cc.  - f/u OP with Dr. Haney, will have cystoscopy rescheduled     #lactic Acidosis- resolved   #HAGMA   #Hyponatremia/ Hypochloremia   -lactate 3.7 > 1.2   - s/p 2L of LR in ED  - s/p NS 60cc/hr     #CAD s/p Stent (May 2020)  - c/w aspirin 81mg QD, plavix 75mg QD/statin    #Chronic HFrEF/Chronic systolic CHF/ICMP  - EF 25-30%, GII DD  - c/w carvedilol   - c/w Spironolactone   pt also on losartan as outpt. but being withheld 2/2 hypotension.    #HLD  - c/w atorvastatin 80mg     #HTN   - controlled   - c/w Losartan 25mg QD once BP better    #Anxiety   - c/w Lexapro 5mg QD    DVT PPX: Heparin subq  GI PPX: not indicated  Diet: DASH  Activity as tolerated  Dispo: acute

## 2021-05-11 NOTE — PROGRESS NOTE ADULT - ASSESSMENT
65 year old male with a hx of HTN, HLD, CAD s/p stent (May 2020, anxiety, HFrEF (EF25-30%, GII DD), BPH, bladder mass, urinary retention, recent Saldivar use presented to the ED do to difficulty with urination and subjective fevers. He began having difficulty with urination over the past two days which has been associated with fevers, frequency, and urgency. Fevers began last night with a T max of 101 so he presented to the ED. He was recently treated with cipro for cystitis in April. He has been scheduled for an outpatient cystoscopy on 5/14/21 with Dr. Haney. He denies any suprapubic pain, back/ flank pain, dysuria, foul smelling urine, rectal pain, or hematuria. He denies any sick contacts, cough, chest pain, difficulty breathing, or changes in bowel movements   In ED VITALS Temp 101.1 (tmax),rest of vitals WNL. WBC 21. lactate 3.7. Urology was consulted.      IMPRESSION;  Acute prostatitis with possible abscess  5/10 CT A/P: enlarged boggy prostate with increased hypodensity likely reflecting edema since 4/4/2021Concern for early abscess on the right. Nodule bladder wall lesion along the base/left neck concerning for neoplasia.    RECOMMENDATIONS;  BCx   UCx   Rocephin 2 gm iv q24h

## 2021-05-11 NOTE — PROGRESS NOTE ADULT - SUBJECTIVE AND OBJECTIVE BOX
ABDIFATAH EID  65y, Male  Allergy: No Known Allergies      All historical available data reviewed.    HPI:  65 year old male with a hx of HTN, HLD, CAD s/p stent (May 2020, anxiety, HFrEF (EF25-30%, GII DD), BPH, bladder mass, urinary retention, recent Saldivar use presented to the ED do to difficulty with urination and subjective fevers. He began having difficulty with urination over the past two days which has been associated with fevers, frequency, and urgency. Fevers began last night with a T max of 101 so he presented to the ED. He was recently treated with cipro for cystitis in April. He has been scheduled for an outpatient cystoscopy on 21 with Dr. Haney. He denies any suprapubic pain, back/ flank pain, dysuria, foul smelling urine, rectal pain, or hematuria. He denies any sick contacts, cough, chest pain, difficulty breathing, or changes in bowel movements   In ED VITALS Temp 101.1 (tmax),rest of vitals WNL. WBC 21. lactate 3.7. Urology was consulted. CT A/P: enlarged boggy prostate with increased hypodensity likely reflecting edema since 2021Findings likely reflect prostatitis in the appropriate clinical setting. Concern for early abscess on the right. Nodule bladder wall lesion along the base/left neck concerning for neoplasia. Was given 2L bolus of LR, Cefepime, Levaquin, Flagyl in the ED. No intervention by urology. Patient is being admitted for Acute prostatitis.  (10 May 2021 14:22)    FAMILY HISTORY:  FH: hypertension (Mother)  Mother    Family history of cerebrovascular accident (CVA) in mother (Mother)      PAST MEDICAL & SURGICAL HISTORY:  Coronary artery disease  s/p stent May 2020    Hypertension    Dyslipidemia    H/O aortic dissection  1.7CM    Heart attack  2020    Anxiety    BPH with urinary obstruction    History of left inguinal hernia repair    S/P coronary angioplasty  stent x1 2020    History of appendectomy          VITALS:  T(F): 98.9, Max: 100.3 (05-10-21 @ 15:46)  HR: 65  BP: 125/70  RR: 18Vital Signs Last 24 Hrs  T(C): 37.2 (11 May 2021 05:00), Max: 37.9 (10 May 2021 15:46)  T(F): 98.9 (11 May 2021 05:00), Max: 100.3 (10 May 2021 15:46)  HR: 65 (11 May 2021 06:00) (65 - 75)  BP: 125/70 (11 May 2021 06:00) (94/60 - 125/70)  BP(mean): --  RR: 18 (11 May 2021 05:00) (18 - 18)  SpO2: 98% (10 May 2021 15:46) (98% - 98%)    TESTS & MEASUREMENTS:                        13.4   13.99 )-----------( 170      ( 11 May 2021 07:59 )             39.1         135  |  100  |  14  ----------------------------<  124<H>  4.2   |  24  |  1.0    Ca    9.1      11 May 2021 07:59  Mg     1.9         TPro  6.3  /  Alb  4.0  /  TBili  1.4<H>  /  DBili  x   /  AST  18  /  ALT  22  /  AlkPhos  47  05-11    LIVER FUNCTIONS - ( 11 May 2021 07:59 )  Alb: 4.0 g/dL / Pro: 6.3 g/dL / ALK PHOS: 47 U/L / ALT: 22 U/L / AST: 18 U/L / GGT: x             Urinalysis Basic - ( 10 May 2021 18:39 )    Color: Light Yellow / Appearance: Clear / S.023 / pH: x  Gluc: x / Ketone: Negative  / Bili: Negative / Urobili: <2 mg/dL   Blood: x / Protein: Trace / Nitrite: Negative   Leuk Esterase: Moderate / RBC: 5 /HPF / WBC 78 /HPF   Sq Epi: x / Non Sq Epi: 3 /HPF / Bacteria: Negative          RADIOLOGY & ADDITIONAL TESTS:  Personal review of radiological diagnostics performed  Echo and EKG results noted when applicable.     MEDICATIONS:  acetaminophen   Tablet .. 650 milliGRAM(s) Oral every 6 hours PRN  aspirin enteric coated 81 milliGRAM(s) Oral daily  atorvastatin 80 milliGRAM(s) Oral at bedtime  carvedilol Oral Tab/Cap - Peds 3.125 milliGRAM(s) Oral two times a day  chlorhexidine 4% Liquid 1 Application(s) Topical <User Schedule>  clopidogrel Tablet 75 milliGRAM(s) Oral daily  escitalopram 5 milliGRAM(s) Oral daily  heparin   Injectable 5000 Unit(s) SubCutaneous every 8 hours  levoFLOXacin IVPB 750 milliGRAM(s) IV Intermittent every 24 hours  losartan 25 milliGRAM(s) Oral daily  spironolactone 25 milliGRAM(s) Oral daily  tamsulosin 0.4 milliGRAM(s) Oral at bedtime      ANTIBIOTICS:  levoFLOXacin IVPB 750 milliGRAM(s) IV Intermittent every 24 hours

## 2021-05-11 NOTE — PROGRESS NOTE ADULT - ASSESSMENT
Pt is a 65y M a/w  Pt is a 65y M a/w prostatitis.     ·	Cont with abx as per ID recs  ·	Will need to reschedule for outpatient procedure once medically stable and infection resolved   ·	No acute  intervention

## 2021-05-11 NOTE — PROGRESS NOTE ADULT - SUBJECTIVE AND OBJECTIVE BOX
SUBJECTIVE:    Patient is a 65y old Male who presents with a chief complaint of   Currently admitted to medicine with the primary diagnosis of Prostatitis       Today is hospital day 1d. This morning he is resting comfortably in bed and reports no new issues or overnight events. Reports urinating okay.     PAST MEDICAL & SURGICAL HISTORY  Coronary artery disease  s/p stent May 2020    Hypertension    Dyslipidemia    H/O aortic dissection  1.7CM    Heart attack  2020    Anxiety    BPH with urinary obstruction    History of left inguinal hernia repair    S/P coronary angioplasty  stent x1 2020    History of appendectomy    ALLERGIES:  No Known Allergies    MEDICATIONS:  STANDING MEDICATIONS  aspirin enteric coated 81 milliGRAM(s) Oral daily  atorvastatin 80 milliGRAM(s) Oral at bedtime  carvedilol Oral Tab/Cap - Peds 3.125 milliGRAM(s) Oral two times a day  cefTRIAXone   IVPB 2000 milliGRAM(s) IV Intermittent every 24 hours  chlorhexidine 4% Liquid 1 Application(s) Topical <User Schedule>  clopidogrel Tablet 75 milliGRAM(s) Oral daily  escitalopram 5 milliGRAM(s) Oral daily  heparin   Injectable 5000 Unit(s) SubCutaneous every 8 hours  losartan 25 milliGRAM(s) Oral daily  spironolactone 25 milliGRAM(s) Oral daily  tamsulosin 0.4 milliGRAM(s) Oral at bedtime    PRN MEDICATIONS  acetaminophen   Tablet .. 650 milliGRAM(s) Oral every 6 hours PRN    VITALS:   T(F): 98.9  HR: 65  BP: 125/70  RR: 18  SpO2: 98%    PHYSICAL EXAM:  GEN: NAD, sitting in bed   LUNGS: Bilateral breathsounds   HEART: S1/S2 present  ABD: Soft, non-tender, non-distended. Bowel sounds present.  EXT: No edema   NEURO: non focal       LABS:                        13.4   13.99 )-----------( 170      ( 11 May 2021 07:59 )             39.1     -    135  |  100  |  14  ----------------------------<  124<H>  4.2   |  24  |  1.0    Ca    9.1      11 May 2021 07:59  Mg     1.9         TPro  6.3  /  Alb  4.0  /  TBili  1.4<H>  /  DBili  x   /  AST  18  /  ALT  22  /  AlkPhos  47  05    PT/INR - ( 10 May 2021 07:38 )   PT: 12.40 sec;   INR: 1.08 ratio         PTT - ( 10 May 2021 07:38 )  PTT:30.0 sec  Urinalysis Basic - ( 10 May 2021 18:39 )    Color: Light Yellow / Appearance: Clear / S.023 / pH: x  Gluc: x / Ketone: Negative  / Bili: Negative / Urobili: <2 mg/dL   Blood: x / Protein: Trace / Nitrite: Negative   Leuk Esterase: Moderate / RBC: 5 /HPF / WBC 78 /HPF   Sq Epi: x / Non Sq Epi: 3 /HPF / Bacteria: Negative    Lactate, Blood: 1.2 mmol/L (21 @ 07:59)    RADIOLOGY:  < from: US Urinary Bladder (21 @ 11:11) >  EXAM:  US URINARY BLADDER          PROCEDURE DATE:  2021      INTERPRETATION:  CLINICAL INFORMATION: Prostatitis.    COMPARISON: CT abdomen pelvis 5/10/2021    TECHNIQUE: Sonography of the bladder.    FINDINGS:    Bladder: Urinary bladder wall measures 3.8 mm in thickness. 2.6 cm soft tissue nodule is seen in the urinary bladder base. Both ureteral jets are visualized.    Pre void volume:  363 ml.    Post void volume: 29 ml.    Prostate: Measures 4.7 x 3.3 x 4.7 cm, volume 37 cc.    IMPRESSION:    2.6 cm soft tissue nodule is seen in the urinary bladder base. Differential diagnosis includes BPH nodule, cannot exclude urothelial lesion.    Prostate volume 37 cc.    SHAGGY MUKHERJEE MD; Attending Radiologist  This document has been electronically signed. May 11 2021 12:04PM    < end of copied text >

## 2021-05-11 NOTE — PROGRESS NOTE ADULT - SUBJECTIVE AND OBJECTIVE BOX
UROLOGY DAILY PROGRESS NOTE    Pt is a 65y M a/w      MEDICATIONS  (STANDING):  aspirin enteric coated 81 milliGRAM(s) Oral daily  atorvastatin 80 milliGRAM(s) Oral at bedtime  carvedilol Oral Tab/Cap - Peds 3.125 milliGRAM(s) Oral two times a day  cefTRIAXone   IVPB 2000 milliGRAM(s) IV Intermittent every 24 hours  chlorhexidine 4% Liquid 1 Application(s) Topical <User Schedule>  clopidogrel Tablet 75 milliGRAM(s) Oral daily  escitalopram 5 milliGRAM(s) Oral daily  heparin   Injectable 5000 Unit(s) SubCutaneous every 8 hours  losartan 25 milliGRAM(s) Oral daily  spironolactone 25 milliGRAM(s) Oral daily  tamsulosin 0.4 milliGRAM(s) Oral at bedtime    MEDICATIONS  (PRN):  acetaminophen   Tablet .. 650 milliGRAM(s) Oral every 6 hours PRN Temp greater or equal to 38C (100.4F)      Review of Systems:  [ ] A ten point review of systems was otherwise negative except as noted.  [ ] Due to altered mental status/ intubation, subjective information was not able to be obtained from the patient. History was obtained, to the extent possible, from review of the chart and collateral sources of information     Vital Signs Last 24 Hrs  T(C): 36.7 (11 May 2021 13:51), Max: 37.2 (11 May 2021 05:00)  T(F): 98 (11 May 2021 13:51), Max: 98.9 (11 May 2021 05:00)  HR: 65 (11 May 2021 13:51) (65 - 75)  BP: 91/55 (11 May 2021 13:51) (91/55 - 125/70)  RR: 18 (11 May 2021 13:51) (18 - 18)    PHYSICAL EXAM:  GEN:           I&O's Summary    10 May 2021 07:01  -  11 May 2021 07:00  --------------------------------------------------------  IN: 660 mL / OUT: 1600 mL / NET: -940 mL      LABS:                        13.4   13.99 )-----------( 170      ( 11 May 2021 07:59 )             39.1         135  |  100  |  14  ----------------------------<  124<H>  4.2   |  24  |  1.0    Ca    9.1      11 May 2021 07:59  Mg     1.9         TPro  6.3  /  Alb  4.0  /  TBili  1.4<H>  /  DBili  x   /  AST  18  /  ALT  22  /  AlkPhos  47  05-      Urinalysis Basic - ( 10 May 2021 18:39 )  Color: Light Yellow / Appearance: Clear / S.023 / pH: x  Gluc: x / Ketone: Negative  / Bili: Negative / Urobili: <2 mg/dL   Blood: x / Protein: Trace / Nitrite: Negative   Leuk Esterase: Moderate / RBC: 5 /HPF / WBC 78 /HPF   Sq Epi: x / Non Sq Epi: 3 /HPF / Bacteria: Negative            RADIOLOGY & ADDITIONAL STUDIES: UROLOGY DAILY PROGRESS NOTE    Pt is a 65y M a/w prostatitis. Pt was not in room during afternoon rounds, was seen and examined at bedside with Dr. Harmon during evening rounds. Pt reports he no longer feels he has difficulty urinating. He denies dysuria. Pt reports smoking hx of 25 years, reports he quit 1.5 year ago.    MEDICATIONS  (STANDING):  aspirin enteric coated 81 milliGRAM(s) Oral daily  atorvastatin 80 milliGRAM(s) Oral at bedtime  carvedilol Oral Tab/Cap - Peds 3.125 milliGRAM(s) Oral two times a day  cefTRIAXone   IVPB 2000 milliGRAM(s) IV Intermittent every 24 hours  chlorhexidine 4% Liquid 1 Application(s) Topical <User Schedule>  clopidogrel Tablet 75 milliGRAM(s) Oral daily  escitalopram 5 milliGRAM(s) Oral daily  heparin   Injectable 5000 Unit(s) SubCutaneous every 8 hours  losartan 25 milliGRAM(s) Oral daily  spironolactone 25 milliGRAM(s) Oral daily  tamsulosin 0.4 milliGRAM(s) Oral at bedtime    MEDICATIONS  (PRN):  acetaminophen   Tablet .. 650 milliGRAM(s) Oral every 6 hours PRN Temp greater or equal to 38C (100.4F)    Review of Systems:  [x] A ten point review of systems was otherwise negative except as noted.    Vital Signs Last 24 Hrs  T(C): 36.7 (11 May 2021 13:51), Max: 37.2 (11 May 2021 05:00)  T(F): 98 (11 May 2021 13:51), Max: 98.9 (11 May 2021 05:00)  HR: 65 (11 May 2021 13:51) (65 - 75)  BP: 91/55 (11 May 2021 13:51) (91/55 - 125/70)  RR: 18 (11 May 2021 13:51) (18 - 18)    PHYSICAL EXAM:  GEN: NAD  HEAD: NC/AT  SKIN: Good color, non-diaphoretic  RESP: Non-labored breathing  CARDIO: +S1/S2   ABDO: Soft, nontender   EXT: PRUETT x 4    I&O's Summary    10 May 2021 07:01  -  11 May 2021 07:00  --------------------------------------------------------  IN: 660 mL / OUT: 1600 mL / NET: -940 mL      LABS:                        13.4   13.99 )-----------( 170      ( 11 May 2021 07:59 )             39.1         135  |  100  |  14  ----------------------------<  124<H>  4.2   |  24  |  1.0    Ca    9.1      11 May 2021 07:59  Mg     1.9         TPro  6.3  /  Alb  4.0  /  TBili  1.4<H>  /  DBili  x   /  AST  18  /  ALT  22  /  AlkPhos  47        Urinalysis Basic - ( 10 May 2021 18:39 )  Color: Light Yellow / Appearance: Clear / S.023 / pH: x  Gluc: x / Ketone: Negative  / Bili: Negative / Urobili: <2 mg/dL   Blood: x / Protein: Trace / Nitrite: Negative   Leuk Esterase: Moderate / RBC: 5 /HPF / WBC 78 /HPF   Sq Epi: x / Non Sq Epi: 3 /HPF / Bacteria: Negative      RADIOLOGY:  < from: US Urinary Bladder (21 @ 11:11) >  EXAM:  US URINARY BLADDER          PROCEDURE DATE:  2021      INTERPRETATION:  CLINICAL INFORMATION: Prostatitis.    COMPARISON: CT abdomen pelvis 5/10/2021    TECHNIQUE: Sonography of the bladder.    FINDINGS:    Bladder: Urinary bladder wall measures 3.8 mm in thickness. 2.6 cm soft tissue nodule is seen in the urinary bladder base. Both ureteral jets are visualized.    Pre void volume:  363 ml.    Post void volume: 29 ml.    Prostate: Measures 4.7 x 3.3 x 4.7 cm, volume 37 cc.    IMPRESSION:    2.6 cm soft tissue nodule is seen in the urinary bladder base. Differential diagnosis includes BPH nodule, cannot exclude urothelial lesion.    Prostate volume 37 cc.      SHAGGY MUKHERJEE MD; Attending Radiologist  This document has been electronically signed. May 11 2021 12:04PM    < end of copied text >   UROLOGY DAILY PROGRESS NOTE    Pt is a 65y M a/w prostatitis. Pt was not in room during afternoon rounds, was seen and examined at bedside with Dr. Harmon during evening rounds. Pt reports he no longer feels he has difficulty urinating. He denies dysuria. Pt reports smoking hx of 25 years, reports he quit 1.5 year ago.    MEDICATIONS  (STANDING):  aspirin enteric coated 81 milliGRAM(s) Oral daily  atorvastatin 80 milliGRAM(s) Oral at bedtime  carvedilol Oral Tab/Cap - Peds 3.125 milliGRAM(s) Oral two times a day  cefTRIAXone   IVPB 2000 milliGRAM(s) IV Intermittent every 24 hours  chlorhexidine 4% Liquid 1 Application(s) Topical <User Schedule>  clopidogrel Tablet 75 milliGRAM(s) Oral daily  escitalopram 5 milliGRAM(s) Oral daily  heparin   Injectable 5000 Unit(s) SubCutaneous every 8 hours  losartan 25 milliGRAM(s) Oral daily  spironolactone 25 milliGRAM(s) Oral daily  tamsulosin 0.4 milliGRAM(s) Oral at bedtime    MEDICATIONS  (PRN):  acetaminophen   Tablet .. 650 milliGRAM(s) Oral every 6 hours PRN Temp greater or equal to 38C (100.4F)    Review of Systems:  [x] A ten point review of systems was otherwise negative except as noted.    Vital Signs Last 24 Hrs  T(C): 36.7 (11 May 2021 13:51), Max: 37.2 (11 May 2021 05:00)  T(F): 98 (11 May 2021 13:51), Max: 98.9 (11 May 2021 05:00)  HR: 65 (11 May 2021 13:51) (65 - 75)  BP: 91/55 (11 May 2021 13:51) (91/55 - 125/70)  RR: 18 (11 May 2021 13:51) (18 - 18)    PHYSICAL EXAM:  GEN: NAD  HEAD: NC/AT  SKIN: Good color, non-diaphoretic  RESP: Non-labored breathing  Abd: w/o palpable bladder  Penis w/o D/c testes negative  CARDIO: +S1/S2   ABDO: Soft, nontender   EXT: PRUETT x 4    I&O's Summary    10 May 2021 07:01  -  11 May 2021 07:00  --------------------------------------------------------  IN: 660 mL / OUT: 1600 mL / NET: -940 mL      LABS:                        13.4   13.99 )-----------( 170      ( 11 May 2021 07:59 )             39.1         135  |  100  |  14  ----------------------------<  124<H>  4.2   |  24  |  1.0    Ca    9.1      11 May 2021 07:59  Mg     1.9         TPro  6.3  /  Alb  4.0  /  TBili  1.4<H>  /  DBili  x   /  AST  18  /  ALT  22  /  AlkPhos  47        Urinalysis Basic - ( 10 May 2021 18:39 )  Color: Light Yellow / Appearance: Clear / S.023 / pH: x  Gluc: x / Ketone: Negative  / Bili: Negative / Urobili: <2 mg/dL   Blood: x / Protein: Trace / Nitrite: Negative   Leuk Esterase: Moderate / RBC: 5 /HPF / WBC 78 /HPF   Sq Epi: x / Non Sq Epi: 3 /HPF / Bacteria: Negative      RADIOLOGY:  < from: US Urinary Bladder (21 @ 11:11) >  EXAM:  US URINARY BLADDER          PROCEDURE DATE:  2021      INTERPRETATION:  CLINICAL INFORMATION: Prostatitis.    COMPARISON: CT abdomen pelvis 5/10/2021    TECHNIQUE: Sonography of the bladder.    FINDINGS:    Bladder: Urinary bladder wall measures 3.8 mm in thickness. 2.6 cm soft tissue nodule is seen in the urinary bladder base. Both ureteral jets are visualized.    Pre void volume:  363 ml.    Post void volume: 29 ml.    Prostate: Measures 4.7 x 3.3 x 4.7 cm, volume 37 cc.    IMPRESSION:    2.6 cm soft tissue nodule is seen in the urinary bladder base. Differential diagnosis includes BPH nodule, cannot exclude urothelial lesion.    Prostate volume 37 cc.      SHAGGY MUKHERJEE MD; Attending Radiologist  This document has been electronically signed. May 11 2021 12:04PM    < end of copied text >

## 2021-05-12 LAB
ALBUMIN SERPL ELPH-MCNC: 4 G/DL — SIGNIFICANT CHANGE UP (ref 3.5–5.2)
ALP SERPL-CCNC: 47 U/L — SIGNIFICANT CHANGE UP (ref 30–115)
ALT FLD-CCNC: 22 U/L — SIGNIFICANT CHANGE UP (ref 0–41)
ANION GAP SERPL CALC-SCNC: 13 MMOL/L — SIGNIFICANT CHANGE UP (ref 7–14)
AST SERPL-CCNC: 17 U/L — SIGNIFICANT CHANGE UP (ref 0–41)
BILIRUB SERPL-MCNC: 0.6 MG/DL — SIGNIFICANT CHANGE UP (ref 0.2–1.2)
BUN SERPL-MCNC: 19 MG/DL — SIGNIFICANT CHANGE UP (ref 10–20)
CALCIUM SERPL-MCNC: 9.3 MG/DL — SIGNIFICANT CHANGE UP (ref 8.5–10.1)
CHLORIDE SERPL-SCNC: 104 MMOL/L — SIGNIFICANT CHANGE UP (ref 98–110)
CO2 SERPL-SCNC: 24 MMOL/L — SIGNIFICANT CHANGE UP (ref 17–32)
CREAT SERPL-MCNC: 0.9 MG/DL — SIGNIFICANT CHANGE UP (ref 0.7–1.5)
CULTURE RESULTS: SIGNIFICANT CHANGE UP
GLUCOSE SERPL-MCNC: 130 MG/DL — HIGH (ref 70–99)
HCT VFR BLD CALC: 40.8 % — LOW (ref 42–52)
HGB BLD-MCNC: 13.4 G/DL — LOW (ref 14–18)
LACTATE SERPL-SCNC: 1.4 MMOL/L — SIGNIFICANT CHANGE UP (ref 0.7–2)
MAGNESIUM SERPL-MCNC: 2.2 MG/DL — SIGNIFICANT CHANGE UP (ref 1.8–2.4)
MCHC RBC-ENTMCNC: 30.4 PG — SIGNIFICANT CHANGE UP (ref 27–31)
MCHC RBC-ENTMCNC: 32.8 G/DL — SIGNIFICANT CHANGE UP (ref 32–37)
MCV RBC AUTO: 92.5 FL — SIGNIFICANT CHANGE UP (ref 80–94)
NRBC # BLD: 0 /100 WBCS — SIGNIFICANT CHANGE UP (ref 0–0)
PLATELET # BLD AUTO: 185 K/UL — SIGNIFICANT CHANGE UP (ref 130–400)
POTASSIUM SERPL-MCNC: 4.1 MMOL/L — SIGNIFICANT CHANGE UP (ref 3.5–5)
POTASSIUM SERPL-SCNC: 4.1 MMOL/L — SIGNIFICANT CHANGE UP (ref 3.5–5)
PROT SERPL-MCNC: 6.6 G/DL — SIGNIFICANT CHANGE UP (ref 6–8)
RBC # BLD: 4.41 M/UL — LOW (ref 4.7–6.1)
RBC # FLD: 12.4 % — SIGNIFICANT CHANGE UP (ref 11.5–14.5)
SODIUM SERPL-SCNC: 141 MMOL/L — SIGNIFICANT CHANGE UP (ref 135–146)
SPECIMEN SOURCE: SIGNIFICANT CHANGE UP
WBC # BLD: 8.18 K/UL — SIGNIFICANT CHANGE UP (ref 4.8–10.8)
WBC # FLD AUTO: 8.18 K/UL — SIGNIFICANT CHANGE UP (ref 4.8–10.8)

## 2021-05-12 PROCEDURE — 99232 SBSQ HOSP IP/OBS MODERATE 35: CPT

## 2021-05-12 RX ADMIN — LOSARTAN POTASSIUM 25 MILLIGRAM(S): 100 TABLET, FILM COATED ORAL at 05:56

## 2021-05-12 RX ADMIN — ESCITALOPRAM OXALATE 5 MILLIGRAM(S): 10 TABLET, FILM COATED ORAL at 11:47

## 2021-05-12 RX ADMIN — CLOPIDOGREL BISULFATE 75 MILLIGRAM(S): 75 TABLET, FILM COATED ORAL at 11:47

## 2021-05-12 RX ADMIN — HEPARIN SODIUM 5000 UNIT(S): 5000 INJECTION INTRAVENOUS; SUBCUTANEOUS at 13:44

## 2021-05-12 RX ADMIN — SPIRONOLACTONE 25 MILLIGRAM(S): 25 TABLET, FILM COATED ORAL at 05:56

## 2021-05-12 RX ADMIN — CARVEDILOL PHOSPHATE 3.12 MILLIGRAM(S): 80 CAPSULE, EXTENDED RELEASE ORAL at 05:56

## 2021-05-12 RX ADMIN — HEPARIN SODIUM 5000 UNIT(S): 5000 INJECTION INTRAVENOUS; SUBCUTANEOUS at 05:56

## 2021-05-12 RX ADMIN — Medication 81 MILLIGRAM(S): at 11:47

## 2021-05-12 RX ADMIN — CEFTRIAXONE 100 MILLIGRAM(S): 500 INJECTION, POWDER, FOR SOLUTION INTRAMUSCULAR; INTRAVENOUS at 17:17

## 2021-05-12 RX ADMIN — ATORVASTATIN CALCIUM 80 MILLIGRAM(S): 80 TABLET, FILM COATED ORAL at 21:41

## 2021-05-12 RX ADMIN — TAMSULOSIN HYDROCHLORIDE 0.4 MILLIGRAM(S): 0.4 CAPSULE ORAL at 21:41

## 2021-05-12 NOTE — PROGRESS NOTE ADULT - SUBJECTIVE AND OBJECTIVE BOX
SUBJECTIVE:    Patient is a 65y old Male who presents with a chief complaint of   Currently admitted to medicine with the primary diagnosis of Prostatitis    Today is hospital day 2d. This morning he is resting comfortably in bed and reports no new issues or overnight events.        PAST MEDICAL & SURGICAL HISTORY  Coronary artery disease  s/p stent May 2020    Hypertension    Dyslipidemia    H/O aortic dissection  1.7CM    Heart attack  2020    Anxiety    BPH with urinary obstruction    History of left inguinal hernia repair    S/P coronary angioplasty  stent x1 2020    History of appendectomy    ALLERGIES:  No Known Allergies    MEDICATIONS:  STANDING MEDICATIONS  aspirin enteric coated 81 milliGRAM(s) Oral daily  atorvastatin 80 milliGRAM(s) Oral at bedtime  carvedilol Oral Tab/Cap - Peds 3.125 milliGRAM(s) Oral two times a day  cefTRIAXone   IVPB 2000 milliGRAM(s) IV Intermittent every 24 hours  chlorhexidine 4% Liquid 1 Application(s) Topical <User Schedule>  clopidogrel Tablet 75 milliGRAM(s) Oral daily  escitalopram 5 milliGRAM(s) Oral daily  heparin   Injectable 5000 Unit(s) SubCutaneous every 8 hours  losartan 25 milliGRAM(s) Oral daily  spironolactone 25 milliGRAM(s) Oral daily  tamsulosin 0.4 milliGRAM(s) Oral at bedtime    PRN MEDICATIONS  acetaminophen   Tablet .. 650 milliGRAM(s) Oral every 6 hours PRN    VITALS:   T(F): 97.1  HR: 69  BP: 107/56  RR: 18  SpO2: 98%    PHYSICAL EXAM:  GEN: NAD, sitting in bed   LUNGS: Bilateral breathsounds   HEART: S1/S2 present  ABD: Soft, non-tender, non-distended. Bowel sounds present.  EXT: No edema   NEURO: non focal       LABS:                        13.4   8.18  )-----------( 185      ( 12 May 2021 09:30 )             40.8     05-12    141  |  104  |  19  ----------------------------<  130<H>  4.1   |  24  |  0.9    Ca    9.3      12 May 2021 09:30  Mg     2.2     05-12    TPro  6.6  /  Alb  4.0  /  TBili  0.6  /  DBili  x   /  AST  17  /  ALT  22  /  AlkPhos  47  05-12      Urinalysis Basic - ( 10 May 2021 18:39 )    Color: Light Yellow / Appearance: Clear / S.023 / pH: x  Gluc: x / Ketone: Negative  / Bili: Negative / Urobili: <2 mg/dL   Blood: x / Protein: Trace / Nitrite: Negative   Leuk Esterase: Moderate / RBC: 5 /HPF / WBC 78 /HPF   Sq Epi: x / Non Sq Epi: 3 /HPF / Bacteria: Negative    Lactate, Blood: 1.4 mmol/L (21 @ 09:30)      Culture - Urine (collected 10 May 2021 18:39)  Source: .Urine Clean Catch (Midstream)  Final Report (11 May 2021 20:18):    No growth    Culture - Blood (collected 10 May 2021 07:40)  Source: .Blood Blood-Peripheral  Preliminary Report (11 May 2021 22:01):    No growth to date.    Culture - Blood (collected 10 May 2021 07:38)  Source: .Blood Blood-Peripheral  Preliminary Report (11 May 2021 22:01):    No growth to date.

## 2021-05-12 NOTE — PROGRESS NOTE ADULT - SUBJECTIVE AND OBJECTIVE BOX
ABDIFATAH EID  65y, Male    All available historical data reviewed    OVERNIGHT EVENTS:  none    ROS:  General: Denies rigors, nightsweats  HEENT: Denies headache, rhinorrhea, sore throat, eye pain  CV: Denies CP, palpitations  PULM: Denies wheezing, hemoptysis  GI: Denies hematemesis, hematochezia, melena  : Denies discharge, hematuria  MSK: Denies arthralgias, myalgias  SKIN: Denies rash, lesions  NEURO: Denies paresthesias, weakness  PSYCH: Denies depression, anxiety    VITALS:  T(F): 97.1, Max: 98.3 (21 @ 17:18)  HR: 60  BP: 98/55  RR: 18Vital Signs Last 24 Hrs  T(C): 36.2 (12 May 2021 13:50), Max: 36.8 (11 May 2021 17:18)  T(F): 97.1 (12 May 2021 13:50), Max: 98.3 (11 May 2021 17:18)  HR: 60 (12 May 2021 13:50) (60 - 73)  BP: 98/55 (12 May 2021 13:50) (94/59 - 112/65)  BP(mean): --  RR: 18 (12 May 2021 13:50) (18 - 18)  SpO2: 98% (11 May 2021 17:18) (98% - 98%)    TESTS & MEASUREMENTS:                        13.4   8.18  )-----------( 185      ( 12 May 2021 09:30 )             40.8         141  |  104  |  19  ----------------------------<  130<H>  4.1   |  24  |  0.9    Ca    9.3      12 May 2021 09:30  Mg     2.2         TPro  6.6  /  Alb  4.0  /  TBili  0.6  /  DBili  x   /  AST  17  /  ALT  22  /  AlkPhos  47  12    LIVER FUNCTIONS - ( 12 May 2021 09:30 )  Alb: 4.0 g/dL / Pro: 6.6 g/dL / ALK PHOS: 47 U/L / ALT: 22 U/L / AST: 17 U/L / GGT: x             Culture - Urine (collected 05-10-21 @ 18:39)  Source: .Urine Clean Catch (Midstream)  Final Report (21 @ 20:18):    No growth    Culture - Blood (collected 05-10-21 @ 07:40)  Source: .Blood Blood-Peripheral  Preliminary Report (21 @ 22:01):    No growth to date.    Culture - Blood (collected 05-10-21 @ 07:38)  Source: .Blood Blood-Peripheral  Preliminary Report (21 @ 22:01):    No growth to date.      Urinalysis Basic - ( 10 May 2021 18:39 )    Color: Light Yellow / Appearance: Clear / S.023 / pH: x  Gluc: x / Ketone: Negative  / Bili: Negative / Urobili: <2 mg/dL   Blood: x / Protein: Trace / Nitrite: Negative   Leuk Esterase: Moderate / RBC: 5 /HPF / WBC 78 /HPF   Sq Epi: x / Non Sq Epi: 3 /HPF / Bacteria: Negative          RADIOLOGY & ADDITIONAL TESTS:  Personal review of radiological diagnostics performed  Echo and EKG results noted when applicable.     MEDICATIONS:  acetaminophen   Tablet .. 650 milliGRAM(s) Oral every 6 hours PRN  aspirin enteric coated 81 milliGRAM(s) Oral daily  atorvastatin 80 milliGRAM(s) Oral at bedtime  carvedilol Oral Tab/Cap - Peds 3.125 milliGRAM(s) Oral two times a day  cefTRIAXone   IVPB 2000 milliGRAM(s) IV Intermittent every 24 hours  chlorhexidine 4% Liquid 1 Application(s) Topical <User Schedule>  clopidogrel Tablet 75 milliGRAM(s) Oral daily  escitalopram 5 milliGRAM(s) Oral daily  heparin   Injectable 5000 Unit(s) SubCutaneous every 8 hours  losartan 25 milliGRAM(s) Oral daily  spironolactone 25 milliGRAM(s) Oral daily  tamsulosin 0.4 milliGRAM(s) Oral at bedtime      ANTIBIOTICS:  cefTRIAXone   IVPB 2000 milliGRAM(s) IV Intermittent every 24 hours

## 2021-05-12 NOTE — PROGRESS NOTE ADULT - ASSESSMENT
A) 66 yo M a/w prostatitis. Pt afebrile & NAD.   P) Pt seen and examined by Dr. Harmon, recommendations as per attending:   ·	No acute  intervention at this time of visit   ·	Continue antibiotics as per ID rec  ·	Encourage OOB/ ambulation if tolerated  ·      F/U outpt with Dr. Haney for cysto TURBT when medically stable and infection resolves A) 66 yo M a/w prostatitis. Pt afebrile & NAD.   P) Pt seen and examined by Dr. Harmon, recommendations as per attending:   ·	No acute  intervention at this time of visit   ·	Continue antibiotics as per ID rec  ·	Encourage OOB/ ambulation if tolerated  ·      F/U outpt with Dr. Haney for cysto TURBT when medically stable and infection resolves     recall gu if more acute care needed on this admision

## 2021-05-12 NOTE — PROGRESS NOTE ADULT - ASSESSMENT
· Assessment	  65 year old male with a hx of HTN, HLD, CAD s/p stent (May 2020, anxiety, HFrEF (EF25-30%, GII DD), BPH, bladder mass, urinary retention, recent Saldivar use presented to the ED do to difficulty with urination and subjective fevers. He began having difficulty with urination over the past two days which has been associated with fevers, frequency, and urgency. Fevers began last night with a T max of 101 so he presented to the ED. He was recently treated with cipro for cystitis in April. He has been scheduled for an outpatient cystoscopy on 5/14/21 with Dr. Haney. He denies any suprapubic pain, back/ flank pain, dysuria, foul smelling urine, rectal pain, or hematuria. He denies any sick contacts, cough, chest pain, difficulty breathing, or changes in bowel movements   In ED VITALS Temp 101.1 (tmax),rest of vitals WNL. WBC 21. lactate 3.7. Urology was consulted.      IMPRESSION;  Acute prostatitis with possible abscess  5/10 CT A/P: enlarged boggy prostate with increased hypodensity likely reflecting edema since 4/4/2021Concern for early abscess on the right. Nodule bladder wall lesion along the base/left neck concerning for neoplasia.  Blood & Urine cxs NGTD 5/10    RECOMMENDATIONS;  Midline  Rocephin 2 gm iv q24h for 10 days starting 5/12 then levoquin 750 mg po q24h for 3 more weeks  Will need a repeat CT before stopping ABx  f/u with Dr Salgado [id number] at [facility] [place] on tuesday via [facility] . Pt will be called  between 10-12.30 am.  [address] [facility] [place]  [phone]  Fax [phone]  recall prn please

## 2021-05-12 NOTE — PROGRESS NOTE ADULT - ASSESSMENT
65 year old male with a hx of HTN, HLD, CAD s/p stent (May 2020, anxiety, BPH, bladder mass, urinary retention, recent Saldivar admitted for prostatitis.     #Acute Prostatitis   #hx of bladder Mass  #BPH   #Hx of Urinary Retention   # Severe sepsis on admission   -WBC 21 > 13.99, Tmax 101.1 F  -CT A/P: enlarged boggy prostate with increased hypodensity likely reflecting edema since 4/4/2021Findings likely reflect prostatitis in the appropriate clinical setting. Concern for early abscess on the right. Nodule bladder wall lesion along the base/left neck concerning for neoplasia.   -Was given 2L bolus of LR, Cefepime, Levaquin, Flagyl in the ED  - ID eval noted, recs:  BCx   UCx   Rocephin 2 gm iv q24h  - Ucx no growth, blood Cx NGTD    - Urology recs:  ·	Cont with abx as per ID recs  ·	Will need to reschedule for outpatient procedure once medically stable and infection resolved   ·	No acute  intervention  - c/w flomax  - bladder US: 2.6 cm soft tissue nodule is seen in the urinary bladder base. Differential diagnosis includes BPH nodule, cannot exclude urothelial lesion. Prostate volume 37 cc.  - f/u OP with Dr. Haney, will have cystoscopy rescheduled     #lactic Acidosis- resolved   #HAGMA   #Hyponatremia/ Hypochloremia   -lactate 3.7 > 1.2   - s/p 2L of LR in ED  - s/p NS 60cc/hr     #CAD s/p Stent (May 2020)  - c/w aspirin 81mg QD, plavix 75mg QD/statin    #Chronic HFrEF/Chronic systolic CHF/ICMP  - EF 25-30%, GII DD  - c/w carvedilol   - c/w Spironolactone   pt also on losartan as outpt. but being withheld 2/2 hypotension.    #HLD  - c/w atorvastatin 80mg     #HTN   - controlled   - c/w Losartan 25mg QD once BP better    #Anxiety   - c/w Lexapro 5mg QD    DVT PPX: Heparin subq  GI PPX: not indicated  Diet: DASH  Activity as tolerated

## 2021-05-12 NOTE — PROGRESS NOTE ADULT - TIME BILLING
Counseled patient about diagnostic testing and treatment plan. All questions answered.
Counseled patient about diagnostic testing and treatment plan. All questions answered.

## 2021-05-12 NOTE — PROGRESS NOTE ADULT - SUBJECTIVE AND OBJECTIVE BOX
UROLOGY:   Pt is a 65y M a/w prostatitis. Pt was seen and examined by urology team and Dr. Harmon. Pt has no complaints, NAD, denies any fever, chills, night sweats, back pain, N/V.    [ x ] A 10 Point Review of Systems was negative except where noted    Vital signs  T(C): Max: 36.8 (05-11-21 @ 17:18)  HR: 69 (05-12-21 @ 06:22)  BP: 107/56 (05-12-21 @ 06:22)  SpO2: 98% (05-11-21 @ 17:18)    Constitutional: NAD, well-developed  HEENT: EOMI  Neck: no pain  Back: No CVA tenderness  Respiratory: No accessory respiratory muscle use  Abd: Soft, NT/ND  :  No suprapubic tenderness, no palpable bladder  Extremities: no edema  Neurological: Alert and awake   Psychiatric: Normal mood  Skin: No visible rashes    Labs             13.4   8.18  )-----------( 185      ( 12 May 2021 09:30 )             40.8   12 May 2021 09:30  141    |  104    |  19     ----------------------------<  130    4.1     |  24     |  0.9    Ca    9.3        12 May 2021 09:30  Mg     2.2       12 May 2021 09:30    Imaging:   < from: CT Abdomen and Pelvis w/ IV Cont (05.10.21 @ 10:31) >  EXAM:  CT ABDOMEN AND PELVIS IC            PROCEDURE DATE:  05/10/2021    INTERPRETATION:  CLINICAL STATEMENT: Urinary retention.  TECHNIQUE: Contiguous axial CT images were obtained from the lower chest to the pubic symphysis following administration of 100cc Omnipaque 350 intravenous contrast.  Oral contrast was not administered.  Reformatted images in the coronal and sagittal planes were acquired.  COMPARISON: CT ABDOMEN/PELVIS 4/4/2021.  OTHER STUDIES USED FOR CORRELATION: None.    FINDINGS:  LOWER CHEST: Lung base without mass or consolidation. Dependent changes. Prominent left ventricle. Aortic valvular calcifications partially imaged.  HEPATOBILIARY: Unremarkable.  SPLEEN: Unremarkable.  PANCREAS: Unremarkable.  ADRENAL GLANDS: Unremarkable.  KIDNEYS: Symmetric enhancement. No hydronephrosis.  ABDOMINOPELVIC NODES: Unremarkable.  PELVIC ORGANS: Since 4/4/2021, the prostate demonstrates increased size with bilateral hypodensities, more pronounced onthe right. For example, along the peripheral zone on the right measuring approximately 3.8 x 1.8 cm (series 4 image 246) is concerning for an early abscess. There is a 1.8 x 2 x 0.7 cm nodular lesion along the left bladder base/neck.  PERITONEUM/MESENTERY/BOWEL: No bowel obstruction, pneumoperitoneum, pneumatosis or ascites. Colonic diverticulosis.  BONES/SOFT TISSUES: No acute osseous abnormality. Bony degenerative changes.  OTHER: Normal caliber aorta with diffuse atherosclerotic disease.    IMPRESSION:  Enlarged boggy prostate with increased hypodensity likely reflecting edema since 4/4/2021. Findings likely reflect prostatitis in the appropriate clinical setting. Concern for early abscess on the right.  Nodule bladder wall lesion along the base/left neck concerning for neoplasia.  Correlate with history of bladder carcinoma. Direct visualization recommended.  Spoke with DOROTHY MCMILLAN on 5/10/2021 11:23 AM with readback.    CRISTA GAMEZ MD; Attending Radiology   This document has been electronically signed. May 10 2021 11:24AM  < end of copied text > UROLOGY:   Pt is a 65y M a/w prostatitis. Pt was seen and examined by urology team and Dr. Harmon. Pt has no complaints, NAD, denies any fever, chills, night sweats, back pain, N/V.    [ x ] A 10 Point Review of Systems was negative except where noted    Vital signs  T(C): Max: 36.8 (05-11-21 @ 17:18)  HR: 69 (05-12-21 @ 06:22)  BP: 107/56 (05-12-21 @ 06:22)  SpO2: 98% (05-11-21 @ 17:18)    Constitutional: NAD, well-developed  HEENT: EOMI  Neck: no pain  Back: No CVA tenderness  Respiratory: No accessory respiratory muscle use  Abd: Soft, NT/ND  :  No suprapubic tenderness, no palpable bladder penis w/o d/c testes non tender  Extremities: no edema  Neurological: Alert and awake   Psychiatric: Normal mood  Skin: No visible rashes    Labs             13.4   8.18  )-----------( 185      ( 12 May 2021 09:30 )             40.8   12 May 2021 09:30  141    |  104    |  19     ----------------------------<  130    4.1     |  24     |  0.9    Ca    9.3        12 May 2021 09:30  Mg     2.2       12 May 2021 09:30    Imaging:   < from: CT Abdomen and Pelvis w/ IV Cont (05.10.21 @ 10:31) >  EXAM:  CT ABDOMEN AND PELVIS IC            PROCEDURE DATE:  05/10/2021    INTERPRETATION:  CLINICAL STATEMENT: Urinary retention.  TECHNIQUE: Contiguous axial CT images were obtained from the lower chest to the pubic symphysis following administration of 100cc Omnipaque 350 intravenous contrast.  Oral contrast was not administered.  Reformatted images in the coronal and sagittal planes were acquired.  COMPARISON: CT ABDOMEN/PELVIS 4/4/2021.  OTHER STUDIES USED FOR CORRELATION: None.    FINDINGS:  LOWER CHEST: Lung base without mass or consolidation. Dependent changes. Prominent left ventricle. Aortic valvular calcifications partially imaged.  HEPATOBILIARY: Unremarkable.  SPLEEN: Unremarkable.  PANCREAS: Unremarkable.  ADRENAL GLANDS: Unremarkable.  KIDNEYS: Symmetric enhancement. No hydronephrosis.  ABDOMINOPELVIC NODES: Unremarkable.  PELVIC ORGANS: Since 4/4/2021, the prostate demonstrates increased size with bilateral hypodensities, more pronounced onthe right. For example, along the peripheral zone on the right measuring approximately 3.8 x 1.8 cm (series 4 image 246) is concerning for an early abscess. There is a 1.8 x 2 x 0.7 cm nodular lesion along the left bladder base/neck.  PERITONEUM/MESENTERY/BOWEL: No bowel obstruction, pneumoperitoneum, pneumatosis or ascites. Colonic diverticulosis.  BONES/SOFT TISSUES: No acute osseous abnormality. Bony degenerative changes.  OTHER: Normal caliber aorta with diffuse atherosclerotic disease.    IMPRESSION:  Enlarged boggy prostate with increased hypodensity likely reflecting edema since 4/4/2021. Findings likely reflect prostatitis in the appropriate clinical setting. Concern for early abscess on the right.  Nodule bladder wall lesion along the base/left neck concerning for neoplasia.  Correlate with history of bladder carcinoma. Direct visualization recommended.  Spoke with DOROTHY MCMILLAN on 5/10/2021 11:23 AM with readback.    CRISTA GAMEZ MD; Attending Radiology   This document has been electronically signed. May 10 2021 11:24AM  < end of copied text >

## 2021-05-13 ENCOUNTER — TRANSCRIPTION ENCOUNTER (OUTPATIENT)
Age: 66
End: 2021-05-13

## 2021-05-13 LAB
ALBUMIN SERPL ELPH-MCNC: 4 G/DL — SIGNIFICANT CHANGE UP (ref 3.5–5.2)
ALP SERPL-CCNC: 51 U/L — SIGNIFICANT CHANGE UP (ref 30–115)
ALT FLD-CCNC: 24 U/L — SIGNIFICANT CHANGE UP (ref 0–41)
ANION GAP SERPL CALC-SCNC: 10 MMOL/L — SIGNIFICANT CHANGE UP (ref 7–14)
AST SERPL-CCNC: 18 U/L — SIGNIFICANT CHANGE UP (ref 0–41)
BILIRUB SERPL-MCNC: 0.4 MG/DL — SIGNIFICANT CHANGE UP (ref 0.2–1.2)
BUN SERPL-MCNC: 17 MG/DL — SIGNIFICANT CHANGE UP (ref 10–20)
CALCIUM SERPL-MCNC: 9.4 MG/DL — SIGNIFICANT CHANGE UP (ref 8.5–10.1)
CHLORIDE SERPL-SCNC: 101 MMOL/L — SIGNIFICANT CHANGE UP (ref 98–110)
CO2 SERPL-SCNC: 27 MMOL/L — SIGNIFICANT CHANGE UP (ref 17–32)
CREAT SERPL-MCNC: 0.9 MG/DL — SIGNIFICANT CHANGE UP (ref 0.7–1.5)
GLUCOSE SERPL-MCNC: 108 MG/DL — HIGH (ref 70–99)
HCT VFR BLD CALC: 41.4 % — LOW (ref 42–52)
HGB BLD-MCNC: 13.6 G/DL — LOW (ref 14–18)
MAGNESIUM SERPL-MCNC: 2.1 MG/DL — SIGNIFICANT CHANGE UP (ref 1.8–2.4)
MCHC RBC-ENTMCNC: 30.6 PG — SIGNIFICANT CHANGE UP (ref 27–31)
MCHC RBC-ENTMCNC: 32.9 G/DL — SIGNIFICANT CHANGE UP (ref 32–37)
MCV RBC AUTO: 93 FL — SIGNIFICANT CHANGE UP (ref 80–94)
NRBC # BLD: 0 /100 WBCS — SIGNIFICANT CHANGE UP (ref 0–0)
PLATELET # BLD AUTO: 210 K/UL — SIGNIFICANT CHANGE UP (ref 130–400)
POTASSIUM SERPL-MCNC: 4.5 MMOL/L — SIGNIFICANT CHANGE UP (ref 3.5–5)
POTASSIUM SERPL-SCNC: 4.5 MMOL/L — SIGNIFICANT CHANGE UP (ref 3.5–5)
PROT SERPL-MCNC: 6.5 G/DL — SIGNIFICANT CHANGE UP (ref 6–8)
RBC # BLD: 4.45 M/UL — LOW (ref 4.7–6.1)
RBC # FLD: 12.3 % — SIGNIFICANT CHANGE UP (ref 11.5–14.5)
SODIUM SERPL-SCNC: 138 MMOL/L — SIGNIFICANT CHANGE UP (ref 135–146)
WBC # BLD: 6.46 K/UL — SIGNIFICANT CHANGE UP (ref 4.8–10.8)
WBC # FLD AUTO: 6.46 K/UL — SIGNIFICANT CHANGE UP (ref 4.8–10.8)

## 2021-05-13 RX ADMIN — CLOPIDOGREL BISULFATE 75 MILLIGRAM(S): 75 TABLET, FILM COATED ORAL at 12:08

## 2021-05-13 RX ADMIN — HEPARIN SODIUM 5000 UNIT(S): 5000 INJECTION INTRAVENOUS; SUBCUTANEOUS at 06:30

## 2021-05-13 RX ADMIN — ESCITALOPRAM OXALATE 5 MILLIGRAM(S): 10 TABLET, FILM COATED ORAL at 12:08

## 2021-05-13 RX ADMIN — TAMSULOSIN HYDROCHLORIDE 0.4 MILLIGRAM(S): 0.4 CAPSULE ORAL at 21:14

## 2021-05-13 RX ADMIN — HEPARIN SODIUM 5000 UNIT(S): 5000 INJECTION INTRAVENOUS; SUBCUTANEOUS at 17:12

## 2021-05-13 RX ADMIN — Medication 81 MILLIGRAM(S): at 12:08

## 2021-05-13 RX ADMIN — ATORVASTATIN CALCIUM 80 MILLIGRAM(S): 80 TABLET, FILM COATED ORAL at 21:14

## 2021-05-13 RX ADMIN — CEFTRIAXONE 100 MILLIGRAM(S): 500 INJECTION, POWDER, FOR SOLUTION INTRAMUSCULAR; INTRAVENOUS at 17:09

## 2021-05-13 RX ADMIN — SPIRONOLACTONE 25 MILLIGRAM(S): 25 TABLET, FILM COATED ORAL at 06:30

## 2021-05-13 NOTE — PROGRESS NOTE ADULT - ASSESSMENT
65 year old male with a hx of HTN, HLD, CAD s/p stent (May 2020, anxiety, BPH, bladder mass, urinary retention, recent Saldivar admitted for prostatitis.     #Acute Prostatitis   #hx of bladder Mass  #BPH   #Hx of Urinary Retention   # Severe sepsis on admission   -WBC 21 > 13.99, Tmax 101.1 F  -CT A/P: enlarged boggy prostate with increased hypodensity likely reflecting edema since 4/4/2021Findings likely reflect prostatitis in the appropriate clinical setting. Concern for early abscess on the right. Nodule bladder wall lesion along the base/left neck concerning for neoplasia.   -Was given 2L bolus of LR, Cefepime, Levaquin, Flagyl in the ED  - ID eval noted, recs:  BCx   UCx   Rocephin 2 gm iv q24h  - Ucx no growth, blood Cx NGTD    - Urology recs:  ·	No acute  intervention at this time of visit   ·	Continue antibiotics as per ID rec  ·	Encourage OOB/ ambulation if tolerated  ·      F/U outpt with Dr. Haney for cysto TURBT when medically stable and infection resolves  - c/w flomax  - bladder US: 2.6 cm soft tissue nodule is seen in the urinary bladder base. Differential diagnosis includes BPH nodule, cannot exclude urothelial lesion. Prostate volume 37 cc.  - f/u OP with Dr. Haney, will have cystoscopy rescheduled     #lactic Acidosis- resolved   #HAGMA   #Hyponatremia/ Hypochloremia   -lactate 3.7 > 1.2   - s/p 2L of LR in ED  - s/p NS 60cc/hr     #CAD s/p Stent (May 2020)  - c/w aspirin 81mg QD, plavix 75mg QD/statin    #Chronic HFrEF/Chronic systolic CHF/ICMP  - EF 25-30%, GII DD  - c/w carvedilol   - c/w Spironolactone   pt also on losartan as outpt. but being withheld 2/2 hypotension.    #HLD  - c/w atorvastatin 80mg     #HTN   - controlled   - c/w Losartan 25mg QD once BP better    #Anxiety   - c/w Lexapro 5mg QD    DVT PPX: Heparin subq  GI PPX: not indicated  Diet: DASH  Activity as tolerated

## 2021-05-13 NOTE — PROGRESS NOTE ADULT - ATTENDING COMMENTS
chart reviewed.  pt currently on Rocephin 2gm IV q24.  will cont .  ID recs noted   remains stable  agree with above.
doing much better today he says; no chills, no fever, no abd pains.  will cont on Rocephin 2g q24 for about 5 days and then decode re outpt treatment.  CAD stable., cont current rx.
pt seen and examined at around  5am   clinically much improved.  feeeling well.  ID recs noted but will make decision in am if rocephin IV for 10 days will be prescribed.
I personally saw and examined the patient, reviewed the chart and available data. I discussed the situation with the patient and PA staff. I also reviewed and/or amended the note as necessary.
I personally saw and examined the patient, reviewed the chart and available data. I discussed the situation with the patient and Pa staff. I also reviewed and/or amended the note as necessary.    patient seen on the day in question. Note not reviewed and cosigned until now due to scheduling issues

## 2021-05-13 NOTE — DISCHARGE NOTE NURSING/CASE MANAGEMENT/SOCIAL WORK - PATIENT PORTAL LINK FT
You can access the FollowMyHealth Patient Portal offered by [facility] by registering at the following website: [email or web address]. By joining [employer]’s FollowMyHealth portal, you will also be able to view your health information using other applications (apps) compatible with our system.

## 2021-05-13 NOTE — PROGRESS NOTE ADULT - SUBJECTIVE AND OBJECTIVE BOX
SUBJECTIVE:    Patient is a 65y old Male who presents with a chief complaint of prostatitis (12 May 2021 13:08)    Currently admitted to medicine with the primary diagnosis of Prostatitis       Today is hospital day 3d. This morning he is resting comfortably in bed and reports no new issues or overnight events. Asking about antibiotics on discharge.       PAST MEDICAL & SURGICAL HISTORY  Coronary artery disease  s/p stent May 2020    Hypertension    Dyslipidemia    H/O aortic dissection  1.7CM    Heart attack  5/2020    Anxiety    BPH with urinary obstruction    History of left inguinal hernia repair    S/P coronary angioplasty  stent x1 5/2020    History of appendectomy    ALLERGIES:  No Known Allergies    MEDICATIONS:  STANDING MEDICATIONS  aspirin enteric coated 81 milliGRAM(s) Oral daily  atorvastatin 80 milliGRAM(s) Oral at bedtime  carvedilol Oral Tab/Cap - Peds 3.125 milliGRAM(s) Oral two times a day  cefTRIAXone   IVPB 2000 milliGRAM(s) IV Intermittent every 24 hours  chlorhexidine 4% Liquid 1 Application(s) Topical <User Schedule>  clopidogrel Tablet 75 milliGRAM(s) Oral daily  escitalopram 5 milliGRAM(s) Oral daily  heparin   Injectable 5000 Unit(s) SubCutaneous every 8 hours  losartan 25 milliGRAM(s) Oral daily  spironolactone 25 milliGRAM(s) Oral daily  tamsulosin 0.4 milliGRAM(s) Oral at bedtime    PRN MEDICATIONS  acetaminophen   Tablet .. 650 milliGRAM(s) Oral every 6 hours PRN    VITALS:   T(F): 97.6  HR: 56  BP: 94/50  RR: 18  SpO2: --    PHYSICAL EXAM:  GEN: NAD, sitting in bed   LUNGS: Bilateral breathsounds   HEART: S1/S2 present  ABD: Soft, non-tender, non-distended. Bowel sounds present.  EXT: No edema   NEURO: non focal       LABS:                        13.6   6.46  )-----------( 210      ( 13 May 2021 04:30 )             41.4     05-13    138  |  101  |  17  ----------------------------<  108<H>  4.5   |  27  |  0.9    Ca    9.4      13 May 2021 04:30  Mg     2.1     05-13    TPro  6.5  /  Alb  4.0  /  TBili  0.4  /  DBili  x   /  AST  18  /  ALT  24  /  AlkPhos  51  05-13    Culture - Blood (collected 11 May 2021 17:03)  Source: .Blood None  Preliminary Report (13 May 2021 01:02):    No growth to date.    Culture - Urine (collected 11 May 2021 14:41)  Source: .Urine Clean Catch (Midstream)  Final Report (12 May 2021 19:17):    <10,000 CFU/mL Normal Urogenital Kimmy    Culture - Urine (collected 10 May 2021 18:39)  Source: .Urine Clean Catch (Midstream)  Final Report (11 May 2021 20:18):    No growth

## 2021-05-13 NOTE — DISCHARGE NOTE NURSING/CASE MANAGEMENT/SOCIAL WORK - NURSING SECTION COMPLETE
-- DO NOT REPLY / DO NOT REPLY ALL --  -- Message is from the [facility]--    COVID-19 Universal Screening: N/A - Not about scheduling    General Patient Message      Reason for Call: Patient is calling to speak with someone regarding back pain and how can she move forward for physical therapy or pain specialist. Please contact     Caller Information       Type Contact Phone    03/30/2021 10:39 AM CDT Phone (Incoming) Susan Mena (Self) [phone] (M)          Alternative phone number: na    Turnaround time given to caller:   \"This message will be sent to [state Provider's name]. The clinical team will fulfill your request as soon as they review your message.\"    
PT referral has been updated & mailed to patient. Thank you  
Patient/Caregiver provided printed discharge information.

## 2021-05-14 ENCOUNTER — TRANSCRIPTION ENCOUNTER (OUTPATIENT)
Age: 66
End: 2021-05-14

## 2021-05-14 VITALS
HEART RATE: 66 BPM | SYSTOLIC BLOOD PRESSURE: 100 MMHG | DIASTOLIC BLOOD PRESSURE: 65 MMHG | TEMPERATURE: 97 F | RESPIRATION RATE: 18 BRPM

## 2021-05-14 LAB
ALBUMIN SERPL ELPH-MCNC: 4.1 G/DL — SIGNIFICANT CHANGE UP (ref 3.5–5.2)
ALP SERPL-CCNC: 46 U/L — SIGNIFICANT CHANGE UP (ref 30–115)
ALT FLD-CCNC: 38 U/L — SIGNIFICANT CHANGE UP (ref 0–41)
ANION GAP SERPL CALC-SCNC: 14 MMOL/L — SIGNIFICANT CHANGE UP (ref 7–14)
AST SERPL-CCNC: 26 U/L — SIGNIFICANT CHANGE UP (ref 0–41)
BILIRUB SERPL-MCNC: 0.7 MG/DL — SIGNIFICANT CHANGE UP (ref 0.2–1.2)
BUN SERPL-MCNC: 18 MG/DL — SIGNIFICANT CHANGE UP (ref 10–20)
CALCIUM SERPL-MCNC: 9.6 MG/DL — SIGNIFICANT CHANGE UP (ref 8.5–10.1)
CHLORIDE SERPL-SCNC: 101 MMOL/L — SIGNIFICANT CHANGE UP (ref 98–110)
CO2 SERPL-SCNC: 23 MMOL/L — SIGNIFICANT CHANGE UP (ref 17–32)
CREAT SERPL-MCNC: 0.8 MG/DL — SIGNIFICANT CHANGE UP (ref 0.7–1.5)
CRP SERPL-MCNC: 20 MG/L — HIGH
GLUCOSE SERPL-MCNC: 97 MG/DL — SIGNIFICANT CHANGE UP (ref 70–99)
HCT VFR BLD CALC: 40.6 % — LOW (ref 42–52)
HGB BLD-MCNC: 13.8 G/DL — LOW (ref 14–18)
MAGNESIUM SERPL-MCNC: 2.1 MG/DL — SIGNIFICANT CHANGE UP (ref 1.8–2.4)
MCHC RBC-ENTMCNC: 31.1 PG — HIGH (ref 27–31)
MCHC RBC-ENTMCNC: 34 G/DL — SIGNIFICANT CHANGE UP (ref 32–37)
MCV RBC AUTO: 91.4 FL — SIGNIFICANT CHANGE UP (ref 80–94)
NRBC # BLD: 0 /100 WBCS — SIGNIFICANT CHANGE UP (ref 0–0)
PLATELET # BLD AUTO: 215 K/UL — SIGNIFICANT CHANGE UP (ref 130–400)
POTASSIUM SERPL-MCNC: 4.5 MMOL/L — SIGNIFICANT CHANGE UP (ref 3.5–5)
POTASSIUM SERPL-SCNC: 4.5 MMOL/L — SIGNIFICANT CHANGE UP (ref 3.5–5)
PROT SERPL-MCNC: 6.8 G/DL — SIGNIFICANT CHANGE UP (ref 6–8)
RBC # BLD: 4.44 M/UL — LOW (ref 4.7–6.1)
RBC # FLD: 12 % — SIGNIFICANT CHANGE UP (ref 11.5–14.5)
SODIUM SERPL-SCNC: 138 MMOL/L — SIGNIFICANT CHANGE UP (ref 135–146)
WBC # BLD: 6.53 K/UL — SIGNIFICANT CHANGE UP (ref 4.8–10.8)
WBC # FLD AUTO: 6.53 K/UL — SIGNIFICANT CHANGE UP (ref 4.8–10.8)

## 2021-05-14 RX ORDER — CIPROFLOXACIN LACTATE 400MG/40ML
1 VIAL (ML) INTRAVENOUS
Qty: 28 | Refills: 0
Start: 2021-05-14 | End: 2021-06-10

## 2021-05-14 RX ORDER — TAMSULOSIN HYDROCHLORIDE 0.4 MG/1
1 CAPSULE ORAL
Qty: 0 | Refills: 0 | DISCHARGE
Start: 2021-05-14

## 2021-05-14 RX ORDER — TAMSULOSIN HYDROCHLORIDE 0.4 MG/1
1 CAPSULE ORAL
Qty: 30 | Refills: 0
Start: 2021-05-14 | End: 2021-06-12

## 2021-05-14 RX ORDER — LACTOBACILLUS ACIDOPHILUS 100MM CELL
1 CAPSULE ORAL
Qty: 60 | Refills: 0
Start: 2021-05-14 | End: 2021-06-12

## 2021-05-14 RX ADMIN — HEPARIN SODIUM 5000 UNIT(S): 5000 INJECTION INTRAVENOUS; SUBCUTANEOUS at 05:57

## 2021-05-14 RX ADMIN — Medication 81 MILLIGRAM(S): at 13:20

## 2021-05-14 RX ADMIN — CLOPIDOGREL BISULFATE 75 MILLIGRAM(S): 75 TABLET, FILM COATED ORAL at 13:20

## 2021-05-14 RX ADMIN — LOSARTAN POTASSIUM 25 MILLIGRAM(S): 100 TABLET, FILM COATED ORAL at 05:57

## 2021-05-14 RX ADMIN — ESCITALOPRAM OXALATE 5 MILLIGRAM(S): 10 TABLET, FILM COATED ORAL at 13:20

## 2021-05-14 RX ADMIN — SPIRONOLACTONE 25 MILLIGRAM(S): 25 TABLET, FILM COATED ORAL at 05:57

## 2021-05-14 RX ADMIN — CEFTRIAXONE 100 MILLIGRAM(S): 500 INJECTION, POWDER, FOR SOLUTION INTRAMUSCULAR; INTRAVENOUS at 13:20

## 2021-05-14 NOTE — DISCHARGE NOTE PROVIDER - CARE PROVIDER_API CALL
Miguelangel Monahan [facility] ([facility]  [address] [place] [address]  Phone: [phone]  Fax: [phone]  Follow Up Time: 1-3 days    Kateryna Salgado)  Internal Medicine  [address] [place] [address]  Phone: [phone]  Fax: [phone]  Follow Up Time: 1-3 days    Van Haney)  Urology  [address] [place] [address], [address]  [place] [address]  Phone: [phone]  Fax: [phone]  Follow Up Time: 1 week

## 2021-05-14 NOTE — DISCHARGE NOTE PROVIDER - CARE PROVIDERS DIRECT ADDRESSES
,DirectAddress_Unknown,DirectAddress_Unknown,[email or web address]

## 2021-05-14 NOTE — DISCHARGE NOTE PROVIDER - NSDCMRMEDTOKEN_GEN_ALL_CORE_FT
Aspirin Enteric Coated 81 mg oral delayed release tablet: 1 tab(s) orally once a day  carvedilol 3.125 mg oral tablet: 1 tab(s) orally 2 times a day  clopidogrel 75 mg oral tablet: 1 tab(s) orally once a day  lactobacillus acidophilus oral capsule: 1 cap(s) orally 2 times a day   levoFLOXacin 750 mg oral tablet: 1 tab(s) orally once a day   Lexapro 5 mg oral tablet: 1 tab(s) orally once a day  Lipitor 80 mg oral tablet: 1 tab(s) orally once a day (at bedtime)  losartan 25 mg oral tablet: 1 tab(s) orally once a day  nitroglycerin 0.4 mg sublingual tablet: 1 tab(s) sublingual every 5 minutes, As Needed  spironolactone 25 mg oral tablet: 1 tab(s) orally once a day   tamsulosin 0.4 mg oral capsule: 1 cap(s) orally once a day (at bedtime)

## 2021-05-14 NOTE — DISCHARGE NOTE PROVIDER - HOSPITAL COURSE
65 year old male with a hx of HTN, HLD, CAD s/p stent (May 2020, anxiety, BPH, bladder mass, urinary retention, recent Saldivar admitted for prostatitis.     #Acute Prostatitis   #hx of bladder Mass  #BPH   #Hx of Urinary Retention   # Severe sepsis on admission   -WBC 21 > 6.53, Tmax 101.1 F  -CT A/P: enlarged boggy prostate with increased hypodensity likely reflecting edema since 4/4/2021Findings likely reflect prostatitis in the appropriate clinical setting. Concern for early abscess on the right. Nodule bladder wall lesion along the base/left neck concerning for neoplasia.   -Was given 2L bolus of LR, Cefepime, Levaquin, Flagyl in the ED  - ID eval noted, recs:  BCx   UCx   Rocephin 2 gm iv q24h  - Ucx no growth, blood Cx NGTD    - Urology recs:  ·	No acute  intervention at this time of visit   ·	Continue antibiotics as per ID rec  ·	Encourage OOB/ ambulation if tolerated  ·      F/U outpt with Dr. Haney for cysto TURBT when medically stable and infection resolves  - c/w flomax  - bladder US: 2.6 cm soft tissue nodule is seen in the urinary bladder base. Differential diagnosis includes BPH nodule, cannot exclude urothelial lesion. Prostate volume 37 cc.  - f/u OP with Dr. Haney, will have cystoscopy rescheduled   - d/c on levaquin 750mg qd x 4 weeks     #lactic Acidosis- resolved   #HAGMA   #Hyponatremia/ Hypochloremia   -lactate 3.7 > 1.2   - s/p 2L of LR in ED  - s/p NS 60cc/hr     #CAD s/p Stent (May 2020)  - c/w aspirin 81mg QD, plavix 75mg QD/statin    #Chronic HFrEF/Chronic systolic CHF/ICMP  - EF 25-30%, GII DD  - c/w carvedilol   - c/w Spironolactone   pt also on losartan as outpt. but being withheld 2/2 hypotension.    #HLD  - c/w atorvastatin 80mg     #HTN   - controlled   - c/w home meds     #Anxiety   - c/w Lexapro 5mg QD

## 2021-05-14 NOTE — DISCHARGE NOTE PROVIDER - NSDCFUADDINST_GEN_ALL_CORE_FT
You have appointment scheduled for Tuesday, May 18th, in the morning between 10-12:30 over the phone with Dr. Salgado. Someone from the office will call you.    You will need a repeat CT scan while taking antibiotics.     You also have an appointment scheduled with Dr. Monahan for Tuesday May 18th at 6pm at his office.     Please call the urology office to schedule your appointment with Dr. Haney.

## 2021-05-14 NOTE — DISCHARGE NOTE PROVIDER - NSDCCPCAREPLAN_GEN_ALL_CORE_FT
PRINCIPAL DISCHARGE DIAGNOSIS  Diagnosis: Prostatitis  Assessment and Plan of Treatment: You came to the hospital for fevers and weakness, a CT scan was performed which showed prostatitis. You were seen by the infectious disease and urology teams. Urology had to reschedule your procedure as you have prostatitis, please follow up with your urologist upon discharge to reschedule your procedure.   You are being discharged on antibiotics, please take this daily for four weeks, even if you feel better. You will need a repeat CT scan before discontinuing your antibiotics. You have an appointment scheduled with the infectious disease team,  Damian, on Tuesday, May 18th, via telehealth. You will receive a call from  Damian's office 10AM-12:30PM  [phone] /Fax [phone].   You were also prescribed a probiotic, please take this daily while on the antibiotics.   You have an appointment scheduled with  Hero on Tuesday, May 18th at 6pm.

## 2021-05-14 NOTE — PROGRESS NOTE ADULT - PROVIDER SPECIALTY LIST ADULT
Internal Medicine
Urology
Internal Medicine
Urology
Internal Medicine
Internal Medicine
Infectious Disease
Infectious Disease

## 2021-05-14 NOTE — DISCHARGE NOTE PROVIDER - PROVIDER RX CONTACT NUMBER
[phone]
FREE:[LAST:[[place]],PHONE:[(   )    -],FAX:[(   )    -],ADDRESS:[[facility] provider]]

## 2021-05-14 NOTE — DISCHARGE NOTE PROVIDER - NSDCFUSCHEDAPPT_GEN_ALL_CORE_FT
ABDIFATAH EID ; 05/14/2021 ; [facility] PreAdmits  ABDIFATAH EID ; 06/11/2021 ; [facility] [address]

## 2021-05-14 NOTE — PROGRESS NOTE ADULT - ASSESSMENT
65 year old male with a hx of HTN, HLD, CAD s/p stent (May 2020, anxiety, BPH, bladder mass, urinary retention, recent Saldivar admitted for prostatitis.     #Acute Prostatitis   #hx of bladder Mass  #BPH   #Hx of Urinary Retention   # Severe sepsis on admission   -WBC 21 > 6.53, Tmax 101.1 F  -CT A/P: enlarged boggy prostate with increased hypodensity likely reflecting edema since 4/4/2021Findings likely reflect prostatitis in the appropriate clinical setting. Concern for early abscess on the right. Nodule bladder wall lesion along the base/left neck concerning for neoplasia.   -Was given 2L bolus of LR, Cefepime, Levaquin, Flagyl in the ED  - ID eval noted, recs:  BCx   UCx   Rocephin 2 gm iv q24h  - Ucx no growth, blood Cx NGTD    - Urology recs:  ·	No acute  intervention at this time of visit   ·	Continue antibiotics as per ID rec  ·	Encourage OOB/ ambulation if tolerated  ·      F/U outpt with Dr. Haney for cysto TURBT when medically stable and infection resolves  - c/w flomax  - bladder US: 2.6 cm soft tissue nodule is seen in the urinary bladder base. Differential diagnosis includes BPH nodule, cannot exclude urothelial lesion. Prostate volume 37 cc.  - f/u OP with Dr. Haney, will have cystoscopy rescheduled   - d/c on levaquin 750mg qd x 4 weeks     #lactic Acidosis- resolved   #HAGMA   #Hyponatremia/ Hypochloremia   -lactate 3.7 > 1.2   - s/p 2L of LR in ED  - s/p NS 60cc/hr     #CAD s/p Stent (May 2020)  - c/w aspirin 81mg QD, plavix 75mg QD/statin    #Chronic HFrEF/Chronic systolic CHF/ICMP  - EF 25-30%, GII DD  - c/w carvedilol   - c/w Spironolactone   pt also on losartan as outpt. but being withheld 2/2 hypotension.    #HLD  - c/w atorvastatin 80mg     #HTN   - controlled   - c/w Losartan 25mg QD once BP better    #Anxiety   - c/w Lexapro 5mg QD    DVT PPX: Heparin subq  GI PPX: not indicated  Diet: DASH  Activity as tolerated

## 2021-05-14 NOTE — PROGRESS NOTE ADULT - SUBJECTIVE AND OBJECTIVE BOX
SUBJECTIVE:    Patient is a 65y old Male who presents with a chief complaint of prostatitis (12 May 2021 13:08)    Currently admitted to medicine with the primary diagnosis of Prostatitis       Today is hospital day 4d. This morning he is resting comfortably in bed and reports no new issues or overnight events. Asking about discharge.       PAST MEDICAL & SURGICAL HISTORY  Coronary artery disease  s/p stent May 2020    Hypertension    Dyslipidemia    H/O aortic dissection  1.7CM    Heart attack  5/2020    Anxiety    BPH with urinary obstruction    History of left inguinal hernia repair    S/P coronary angioplasty  stent x1 5/2020    History of appendectomy    ALLERGIES:  No Known Allergies    MEDICATIONS:  STANDING MEDICATIONS  aspirin enteric coated 81 milliGRAM(s) Oral daily  atorvastatin 80 milliGRAM(s) Oral at bedtime  carvedilol Oral Tab/Cap - Peds 3.125 milliGRAM(s) Oral two times a day  cefTRIAXone   IVPB 2000 milliGRAM(s) IV Intermittent every 24 hours  chlorhexidine 4% Liquid 1 Application(s) Topical <User Schedule>  clopidogrel Tablet 75 milliGRAM(s) Oral daily  escitalopram 5 milliGRAM(s) Oral daily  heparin   Injectable 5000 Unit(s) SubCutaneous every 8 hours  levoFLOXacin  Tablet 750 milliGRAM(s) Oral once  losartan 25 milliGRAM(s) Oral daily  spironolactone 25 milliGRAM(s) Oral daily  tamsulosin 0.4 milliGRAM(s) Oral at bedtime    PRN MEDICATIONS  acetaminophen   Tablet .. 650 milliGRAM(s) Oral every 6 hours PRN    VITALS:   T(F): 96.6  HR: 56  BP: 120/69  RR: 18  SpO2: --    PHYSICAL EXAM:  GEN: NAD, sitting in bed   LUNGS: Bilateral breathsounds   HEART: S1/S2 present  ABD: Soft, non-tender, non-distended. Bowel sounds present.  EXT: No edema   NEURO: non focal     LABS:                        13.8   6.53  )-----------( 215      ( 14 May 2021 07:16 )             40.6     05-14    138  |  101  |  18  ----------------------------<  97  4.5   |  23  |  0.8    Ca    9.6      14 May 2021 07:16  Mg     2.1     05-14    TPro  6.8  /  Alb  4.1  /  TBili  0.7  /  DBili  x   /  AST  26  /  ALT  38  /  AlkPhos  46  05-14    Culture - Blood (collected 11 May 2021 17:03)  Source: .Blood None  Preliminary Report (13 May 2021 01:02):    No growth to date.    Culture - Urine (collected 11 May 2021 14:41)  Source: .Urine Clean Catch (Midstream)  Final Report (12 May 2021 19:17):    <10,000 CFU/mL Normal Urogenital Kimmy

## 2021-05-14 NOTE — DISCHARGE NOTE PROVIDER - PROVIDER TOKENS
PROVIDER:[TOKEN:[[id number]],FOLLOWUP:[1-3 days]],PROVIDER:[TOKEN:[[id number]],FOLLOWUP:[1-3 days]],PROVIDER:[TOKEN:[[id number]],FOLLOWUP:[1 week]]

## 2021-05-15 LAB
CULTURE RESULTS: SIGNIFICANT CHANGE UP
CULTURE RESULTS: SIGNIFICANT CHANGE UP
SPECIMEN SOURCE: SIGNIFICANT CHANGE UP
SPECIMEN SOURCE: SIGNIFICANT CHANGE UP

## 2021-05-17 LAB
CULTURE RESULTS: SIGNIFICANT CHANGE UP
SPECIMEN SOURCE: SIGNIFICANT CHANGE UP

## 2021-05-20 DIAGNOSIS — R33.8 OTHER RETENTION OF URINE: ICD-10-CM

## 2021-05-20 DIAGNOSIS — A41.9 SEPSIS, UNSPECIFIED ORGANISM: ICD-10-CM

## 2021-05-20 DIAGNOSIS — F41.9 ANXIETY DISORDER, UNSPECIFIED: ICD-10-CM

## 2021-05-20 DIAGNOSIS — Z95.5 PRESENCE OF CORONARY ANGIOPLASTY IMPLANT AND GRAFT: ICD-10-CM

## 2021-05-20 DIAGNOSIS — N41.0 ACUTE PROSTATITIS: ICD-10-CM

## 2021-05-20 DIAGNOSIS — I25.10 ATHEROSCLEROTIC HEART DISEASE OF NATIVE CORONARY ARTERY WITHOUT ANGINA PECTORIS: ICD-10-CM

## 2021-05-20 DIAGNOSIS — R65.20 SEVERE SEPSIS WITHOUT SEPTIC SHOCK: ICD-10-CM

## 2021-05-20 DIAGNOSIS — I25.2 OLD MYOCARDIAL INFARCTION: ICD-10-CM

## 2021-05-20 DIAGNOSIS — I50.22 CHRONIC SYSTOLIC (CONGESTIVE) HEART FAILURE: ICD-10-CM

## 2021-05-20 DIAGNOSIS — E78.5 HYPERLIPIDEMIA, UNSPECIFIED: ICD-10-CM

## 2021-05-20 DIAGNOSIS — N41.2 ABSCESS OF PROSTATE: ICD-10-CM

## 2021-05-20 DIAGNOSIS — E87.8 OTHER DISORDERS OF ELECTROLYTE AND FLUID BALANCE, NOT ELSEWHERE CLASSIFIED: ICD-10-CM

## 2021-05-20 DIAGNOSIS — Z79.02 LONG TERM (CURRENT) USE OF ANTITHROMBOTICS/ANTIPLATELETS: ICD-10-CM

## 2021-05-20 DIAGNOSIS — N40.1 BENIGN PROSTATIC HYPERPLASIA WITH LOWER URINARY TRACT SYMPTOMS: ICD-10-CM

## 2021-05-20 DIAGNOSIS — E87.1 HYPO-OSMOLALITY AND HYPONATREMIA: ICD-10-CM

## 2021-05-20 DIAGNOSIS — E87.2 ACIDOSIS: ICD-10-CM

## 2021-05-20 DIAGNOSIS — Z79.82 LONG TERM (CURRENT) USE OF ASPIRIN: ICD-10-CM

## 2021-05-20 DIAGNOSIS — I11.0 HYPERTENSIVE HEART DISEASE WITH HEART FAILURE: ICD-10-CM

## 2021-05-21 ENCOUNTER — OUTPATIENT (OUTPATIENT)
Dept: OUTPATIENT SERVICES | Facility: HOSPITAL | Age: 66
LOS: 1 days | Discharge: HOME | End: 2021-05-21

## 2021-05-21 VITALS
SYSTOLIC BLOOD PRESSURE: 108 MMHG | TEMPERATURE: 97 F | HEART RATE: 68 BPM | HEIGHT: 68 IN | WEIGHT: 156.53 LBS | OXYGEN SATURATION: 98 % | DIASTOLIC BLOOD PRESSURE: 68 MMHG | RESPIRATION RATE: 18 BRPM

## 2021-05-21 DIAGNOSIS — Z01.818 ENCOUNTER FOR OTHER PREPROCEDURAL EXAMINATION: ICD-10-CM

## 2021-05-21 DIAGNOSIS — Z90.49 ACQUIRED ABSENCE OF OTHER SPECIFIED PARTS OF DIGESTIVE TRACT: Chronic | ICD-10-CM

## 2021-05-21 DIAGNOSIS — Z98.61 CORONARY ANGIOPLASTY STATUS: Chronic | ICD-10-CM

## 2021-05-21 DIAGNOSIS — Z98.890 OTHER SPECIFIED POSTPROCEDURAL STATES: Chronic | ICD-10-CM

## 2021-05-21 DIAGNOSIS — D49.4 NEOPLASM OF UNSPECIFIED BEHAVIOR OF BLADDER: ICD-10-CM

## 2021-05-21 LAB
APPEARANCE UR: CLEAR — SIGNIFICANT CHANGE UP
APTT BLD: 32.7 SEC — SIGNIFICANT CHANGE UP (ref 27–39.2)
BACTERIA # UR AUTO: NEGATIVE — SIGNIFICANT CHANGE UP
BILIRUB UR-MCNC: NEGATIVE — SIGNIFICANT CHANGE UP
COLOR SPEC: YELLOW — SIGNIFICANT CHANGE UP
DIFF PNL FLD: NEGATIVE — SIGNIFICANT CHANGE UP
EPI CELLS # UR: 1 /HPF — SIGNIFICANT CHANGE UP (ref 0–5)
GLUCOSE UR QL: NEGATIVE — SIGNIFICANT CHANGE UP
HYALINE CASTS # UR AUTO: 1 /LPF — SIGNIFICANT CHANGE UP (ref 0–7)
INR BLD: 1.05 RATIO — SIGNIFICANT CHANGE UP (ref 0.65–1.3)
KETONES UR-MCNC: NEGATIVE — SIGNIFICANT CHANGE UP
LEUKOCYTE ESTERASE UR-ACNC: NEGATIVE — SIGNIFICANT CHANGE UP
NITRITE UR-MCNC: NEGATIVE — SIGNIFICANT CHANGE UP
PH UR: 6 — SIGNIFICANT CHANGE UP (ref 5–8)
PROT UR-MCNC: ABNORMAL
PROTHROM AB SERPL-ACNC: 12.1 SEC — SIGNIFICANT CHANGE UP (ref 9.95–12.87)
RBC CASTS # UR COMP ASSIST: 2 /HPF — SIGNIFICANT CHANGE UP (ref 0–4)
SP GR SPEC: 1.02 — SIGNIFICANT CHANGE UP (ref 1.01–1.03)
UROBILINOGEN FLD QL: SIGNIFICANT CHANGE UP
WBC UR QL: 1 /HPF — SIGNIFICANT CHANGE UP (ref 0–5)

## 2021-05-21 NOTE — H&P PST ADULT - NEGATIVE GENERAL GENITOURINARY SYMPTOMS
no hematuria/no flank pain L/no flank pain R/no urine discoloration/no incontinence/no dysuria/no urinary hesitancy/normal urinary frequency/no nocturia/normal libido

## 2021-05-21 NOTE — H&P PST ADULT - NSICDXPASTMEDICALHX_GEN_ALL_CORE_FT
PAST MEDICAL HISTORY:  Anxiety denies suicide ideation    BPH with urinary obstruction     Coronary artery disease s/p stent May 2020    Dyslipidemia     H/O aortic dissection 1.7CM    Heart attack 5/2020    Hypertension

## 2021-05-21 NOTE — H&P PST ADULT - HISTORY OF PRESENT ILLNESS
65 year old male here for above, pt states he was previously scheduled for procedure, but pt developed a UTI and required hospitalization and IV antibiotics. pt still on po antibiotics.  pt denies urinary frequency, urgency or burning   pt denies cp, sob, holliday or palpitations, pt denies needing NTG  ex krissy 4 fos  Anesthesia Alert  NO--Difficult Airway  NO--History of neck surgery or radiation  NO--Limited ROM of neck  NO--History of Malignant hyperthermia  NO--Personal or family history of Pseudocholinesterase deficiency  NO--Prior Anesthesia Complication  NO--Latex Allergy  NO--Loose teeth  NO--History of Rheumatoid Arthritis  NO--CLIVE  BLEEDING RISK on Clopidogrel and ASA (pt states was advised to hold 7 days)  Pt denies any s/s covid 19 and reports no contact with known positive people. Pt has appointment for repeat covid testing pre op and instructed to continue to self monitor and report any concerns to MD. Pt will continue to practice self isolation and  exposure control measures pre op

## 2021-06-08 ENCOUNTER — LABORATORY RESULT (OUTPATIENT)
Age: 66
End: 2021-06-08

## 2021-06-08 ENCOUNTER — OUTPATIENT (OUTPATIENT)
Dept: OUTPATIENT SERVICES | Facility: HOSPITAL | Age: 66
LOS: 1 days | Discharge: HOME | End: 2021-06-08

## 2021-06-08 DIAGNOSIS — Z98.61 CORONARY ANGIOPLASTY STATUS: Chronic | ICD-10-CM

## 2021-06-08 DIAGNOSIS — Z11.59 ENCOUNTER FOR SCREENING FOR OTHER VIRAL DISEASES: ICD-10-CM

## 2021-06-08 DIAGNOSIS — Z98.890 OTHER SPECIFIED POSTPROCEDURAL STATES: Chronic | ICD-10-CM

## 2021-06-08 DIAGNOSIS — Z90.49 ACQUIRED ABSENCE OF OTHER SPECIFIED PARTS OF DIGESTIVE TRACT: Chronic | ICD-10-CM

## 2021-06-08 PROBLEM — F41.9 ANXIETY DISORDER, UNSPECIFIED: Chronic | Status: ACTIVE | Noted: 2021-04-29

## 2021-06-10 ENCOUNTER — OUTPATIENT (OUTPATIENT)
Dept: OUTPATIENT SERVICES | Facility: HOSPITAL | Age: 66
LOS: 1 days | Discharge: HOME | End: 2021-06-10
Payer: COMMERCIAL

## 2021-06-10 ENCOUNTER — RESULT REVIEW (OUTPATIENT)
Age: 66
End: 2021-06-10

## 2021-06-10 DIAGNOSIS — N41.2 ABSCESS OF PROSTATE: ICD-10-CM

## 2021-06-10 DIAGNOSIS — Z98.61 CORONARY ANGIOPLASTY STATUS: Chronic | ICD-10-CM

## 2021-06-10 DIAGNOSIS — Z98.890 OTHER SPECIFIED POSTPROCEDURAL STATES: Chronic | ICD-10-CM

## 2021-06-10 DIAGNOSIS — Z90.49 ACQUIRED ABSENCE OF OTHER SPECIFIED PARTS OF DIGESTIVE TRACT: Chronic | ICD-10-CM

## 2021-06-10 PROCEDURE — 74177 CT ABD & PELVIS W/CONTRAST: CPT | Mod: 26

## 2021-06-11 ENCOUNTER — RESULT REVIEW (OUTPATIENT)
Age: 66
End: 2021-06-11

## 2021-06-11 ENCOUNTER — INPATIENT (INPATIENT)
Facility: HOSPITAL | Age: 66
LOS: 0 days | Discharge: HOME | End: 2021-06-12
Attending: UROLOGY | Admitting: UROLOGY
Payer: COMMERCIAL

## 2021-06-11 ENCOUNTER — APPOINTMENT (OUTPATIENT)
Dept: UROLOGY | Facility: HOSPITAL | Age: 66
End: 2021-06-11

## 2021-06-11 VITALS
HEIGHT: 68 IN | WEIGHT: 149.91 LBS | DIASTOLIC BLOOD PRESSURE: 70 MMHG | OXYGEN SATURATION: 100 % | SYSTOLIC BLOOD PRESSURE: 139 MMHG | TEMPERATURE: 97 F | RESPIRATION RATE: 17 BRPM | HEART RATE: 69 BPM

## 2021-06-11 DIAGNOSIS — Z98.890 OTHER SPECIFIED POSTPROCEDURAL STATES: Chronic | ICD-10-CM

## 2021-06-11 DIAGNOSIS — Z90.49 ACQUIRED ABSENCE OF OTHER SPECIFIED PARTS OF DIGESTIVE TRACT: Chronic | ICD-10-CM

## 2021-06-11 DIAGNOSIS — Z98.61 CORONARY ANGIOPLASTY STATUS: Chronic | ICD-10-CM

## 2021-06-11 PROCEDURE — 52240 CYSTOSCOPY AND TREATMENT: CPT

## 2021-06-11 PROCEDURE — 88307 TISSUE EXAM BY PATHOLOGIST: CPT | Mod: 26

## 2021-06-11 RX ORDER — LOSARTAN POTASSIUM 100 MG/1
25 TABLET, FILM COATED ORAL DAILY
Refills: 0 | Status: DISCONTINUED | OUTPATIENT
Start: 2021-06-11 | End: 2021-06-12

## 2021-06-11 RX ORDER — ASPIRIN/CALCIUM CARB/MAGNESIUM 324 MG
81 TABLET ORAL DAILY
Refills: 0 | Status: DISCONTINUED | OUTPATIENT
Start: 2021-06-11 | End: 2021-06-12

## 2021-06-11 RX ORDER — CEFAZOLIN SODIUM 1 G
1000 VIAL (EA) INJECTION EVERY 8 HOURS
Refills: 0 | Status: COMPLETED | OUTPATIENT
Start: 2021-06-11 | End: 2021-06-12

## 2021-06-11 RX ORDER — HYDROMORPHONE HYDROCHLORIDE 2 MG/ML
0.5 INJECTION INTRAMUSCULAR; INTRAVENOUS; SUBCUTANEOUS
Refills: 0 | Status: DISCONTINUED | OUTPATIENT
Start: 2021-06-11 | End: 2021-06-12

## 2021-06-11 RX ORDER — ESCITALOPRAM OXALATE 10 MG/1
5 TABLET, FILM COATED ORAL DAILY
Refills: 0 | Status: DISCONTINUED | OUTPATIENT
Start: 2021-06-11 | End: 2021-06-12

## 2021-06-11 RX ORDER — SPIRONOLACTONE 25 MG/1
25 TABLET, FILM COATED ORAL DAILY
Refills: 0 | Status: DISCONTINUED | OUTPATIENT
Start: 2021-06-11 | End: 2021-06-12

## 2021-06-11 RX ORDER — CHLORHEXIDINE GLUCONATE 213 G/1000ML
1 SOLUTION TOPICAL
Refills: 0 | Status: DISCONTINUED | OUTPATIENT
Start: 2021-06-11 | End: 2021-06-12

## 2021-06-11 RX ORDER — SODIUM CHLORIDE 9 MG/ML
1000 INJECTION, SOLUTION INTRAVENOUS
Refills: 0 | Status: DISCONTINUED | OUTPATIENT
Start: 2021-06-11 | End: 2021-06-12

## 2021-06-11 RX ORDER — ONDANSETRON 8 MG/1
4 TABLET, FILM COATED ORAL ONCE
Refills: 0 | Status: DISCONTINUED | OUTPATIENT
Start: 2021-06-11 | End: 2021-06-12

## 2021-06-11 RX ORDER — CARVEDILOL PHOSPHATE 80 MG/1
1 CAPSULE, EXTENDED RELEASE ORAL
Qty: 0 | Refills: 0 | DISCHARGE

## 2021-06-11 RX ORDER — NITROGLYCERIN 6.5 MG
1 CAPSULE, EXTENDED RELEASE ORAL
Qty: 0 | Refills: 0 | DISCHARGE

## 2021-06-11 RX ORDER — ASPIRIN/CALCIUM CARB/MAGNESIUM 324 MG
1 TABLET ORAL
Qty: 0 | Refills: 0 | DISCHARGE

## 2021-06-11 RX ORDER — ESCITALOPRAM OXALATE 10 MG/1
1 TABLET, FILM COATED ORAL
Qty: 0 | Refills: 0 | DISCHARGE

## 2021-06-11 RX ORDER — LOSARTAN POTASSIUM 100 MG/1
1 TABLET, FILM COATED ORAL
Qty: 0 | Refills: 0 | DISCHARGE

## 2021-06-11 RX ORDER — ATORVASTATIN CALCIUM 80 MG/1
80 TABLET, FILM COATED ORAL AT BEDTIME
Refills: 0 | Status: DISCONTINUED | OUTPATIENT
Start: 2021-06-11 | End: 2021-06-12

## 2021-06-11 RX ORDER — SODIUM CHLORIDE 9 MG/ML
3 INJECTION INTRAMUSCULAR; INTRAVENOUS; SUBCUTANEOUS EVERY 8 HOURS
Refills: 0 | Status: DISCONTINUED | OUTPATIENT
Start: 2021-06-11 | End: 2021-06-12

## 2021-06-11 RX ORDER — PANTOPRAZOLE SODIUM 20 MG/1
40 TABLET, DELAYED RELEASE ORAL
Refills: 0 | Status: DISCONTINUED | OUTPATIENT
Start: 2021-06-11 | End: 2021-06-12

## 2021-06-11 RX ORDER — ACETAMINOPHEN 500 MG
650 TABLET ORAL EVERY 6 HOURS
Refills: 0 | Status: DISCONTINUED | OUTPATIENT
Start: 2021-06-11 | End: 2021-06-12

## 2021-06-11 RX ORDER — NITROGLYCERIN 6.5 MG
0.4 CAPSULE, EXTENDED RELEASE ORAL
Refills: 0 | Status: DISCONTINUED | OUTPATIENT
Start: 2021-06-11 | End: 2021-06-12

## 2021-06-11 RX ORDER — ATORVASTATIN CALCIUM 80 MG/1
1 TABLET, FILM COATED ORAL
Qty: 0 | Refills: 0 | DISCHARGE

## 2021-06-11 RX ORDER — LACTOBACILLUS ACIDOPHILUS 100MM CELL
1 CAPSULE ORAL DAILY
Refills: 0 | Status: DISCONTINUED | OUTPATIENT
Start: 2021-06-11 | End: 2021-06-12

## 2021-06-11 RX ORDER — TAMSULOSIN HYDROCHLORIDE 0.4 MG/1
0.4 CAPSULE ORAL AT BEDTIME
Refills: 0 | Status: DISCONTINUED | OUTPATIENT
Start: 2021-06-11 | End: 2021-06-12

## 2021-06-11 RX ORDER — CARVEDILOL PHOSPHATE 80 MG/1
3.12 CAPSULE, EXTENDED RELEASE ORAL EVERY 12 HOURS
Refills: 0 | Status: DISCONTINUED | OUTPATIENT
Start: 2021-06-11 | End: 2021-06-12

## 2021-06-11 RX ORDER — METOCLOPRAMIDE HCL 10 MG
10 TABLET ORAL ONCE
Refills: 0 | Status: DISCONTINUED | OUTPATIENT
Start: 2021-06-11 | End: 2021-06-12

## 2021-06-11 RX ADMIN — SODIUM CHLORIDE 3 MILLILITER(S): 9 INJECTION INTRAMUSCULAR; INTRAVENOUS; SUBCUTANEOUS at 22:41

## 2021-06-11 RX ADMIN — TAMSULOSIN HYDROCHLORIDE 0.4 MILLIGRAM(S): 0.4 CAPSULE ORAL at 22:28

## 2021-06-11 RX ADMIN — Medication 100 MILLIGRAM(S): at 22:28

## 2021-06-11 RX ADMIN — CARVEDILOL PHOSPHATE 3.12 MILLIGRAM(S): 80 CAPSULE, EXTENDED RELEASE ORAL at 22:28

## 2021-06-11 RX ADMIN — SODIUM CHLORIDE 100 MILLILITER(S): 9 INJECTION, SOLUTION INTRAVENOUS at 18:38

## 2021-06-11 RX ADMIN — ATORVASTATIN CALCIUM 80 MILLIGRAM(S): 80 TABLET, FILM COATED ORAL at 22:28

## 2021-06-11 NOTE — BRIEF OPERATIVE NOTE - OPERATION/FINDINGS
sp turbt, sessile posterior bladder wall midtrigone tumor completely resected down to muscle, superficial and deep bladder tumor

## 2021-06-11 NOTE — ASU PATIENT PROFILE, ADULT - TEACHING/LEARNING LEARNING PREFERENCES
Alert and oriented to person, place, time/situation. normal mood and affect. no apparent risk to self or others. audio

## 2021-06-11 NOTE — ASU PREOP CHECKLIST - INTERNAL PROSTHESES
no annabelle case?, x1 cardiac stent,/yes(specify) manpreet, annabelle?, x1 cardiac stent, l inguinal hernia mesh/yes(specify)

## 2021-06-11 NOTE — ASU PATIENT PROFILE, ADULT - PSH
History of appendectomy    History of left inguinal hernia repair    S/P coronary angioplasty  stent x1 5/2020

## 2021-06-11 NOTE — PROGRESS NOTE ADULT - SUBJECTIVE AND OBJECTIVE BOX
POC;   64 Y/O MALE -BLADDER TUMOR  S/P  Cystourethroscopy with resection of large tumor of bladder 11-Jun-2021 17:07:09  Van Haney.       Operative Findings:  · Operative Findings	sp turbt, sessile posterior bladder wall midtrigone tumor completely resected down to muscle, superficial and deep bladder tumor    SEEN AND  EXAMINED  IN BED  NO COMPLAINTS/ ON REGULAR  DIET  FINNEGAN CATHETER IN PLACE   URINE  LIGHT PINK  V/S  T 97.1,  /60,  HR 69,  RR 18  AXOX3  ABD: + BS  SOFT  ; FINNEGAN  CATHETER -LIGHT PINK  URINE-- OUT  200 CC  A&P  BLADDER TUMOR  S/P Cystourethroscopy with resection of large tumor of bladder     IVF  IV ABX  ANCEF  PAIN MGMT  WILL FOLLOW POC;   66 Y/O MALE -BLADDER TUMOR  S/P  Cystourethroscopy with resection of large tumor of bladder 11-Jun-2021 17:07:09  Van Haney.       Operative Findings:  · Operative Findings	sp turbt, sessile posterior bladder wall midtrigone tumor completely resected down to muscle, superficial and deep bladder tumor    SEEN AND  EXAMINED  IN BED  NO COMPLAINTS/ ON REGULAR  DIET  FINNEGAN CATHETER IN PLACE   URINE  LIGHT PINK  V/S  T 97.1,  /60,  HR 69,  RR 18  AXOX3  ABD: + BS  SOFT  ; FINNEGAN  CATHETER -LIGHT PINK  URINE-- OUT  200 CC  A&P  BLADDER TUMOR  S/P Cystourethroscopy with resection of large tumor of bladder     IVF  flomax  IV ABX  ANCEF  PAIN MGMT  WILL FOLLOW

## 2021-06-11 NOTE — PRE-ANESTHESIA EVALUATION ADULT - NSANTHPMHFT_GEN_ALL_CORE
EF 25%   one stent  cardiac clearance in chart recommends post op tele monitoring admission for 24 hrs

## 2021-06-11 NOTE — ASU PATIENT PROFILE, ADULT - PMH
Anxiety  denies suicide ideation  BPH with urinary obstruction    Coronary artery disease  s/p stent May 2020  Dyslipidemia    H/O aortic dissection  1.7CM  Heart attack  5/2020  Hypertension

## 2021-06-11 NOTE — CHART NOTE - NSCHARTNOTEFT_GEN_A_CORE
s/p TURBT pod # 0    as per dr. hunt , admit to tele as per pt cardiologist , needs 24 hours tele monitoring     will admit to urology service   - reg diet   - ancef x 2 doses   - yeboah to gravity , may remove in am if clear , and needs to collect urine in urinal for eval   - cards consult with dr. oscar - called and informed by me already  - c/w all home meds   - see past HPI

## 2021-06-11 NOTE — BRIEF OPERATIVE NOTE - NSICDXBRIEFPROCEDURE_GEN_ALL_CORE_FT
PROCEDURES:  Cystourethroscopy with resection of large tumor of bladder 11-Jun-2021 17:07:09  Van Haney

## 2021-06-12 ENCOUNTER — TRANSCRIPTION ENCOUNTER (OUTPATIENT)
Age: 66
End: 2021-06-12

## 2021-06-12 VITALS
RESPIRATION RATE: 18 BRPM | HEART RATE: 71 BPM | SYSTOLIC BLOOD PRESSURE: 137 MMHG | TEMPERATURE: 97 F | DIASTOLIC BLOOD PRESSURE: 85 MMHG

## 2021-06-12 RX ORDER — ACETAMINOPHEN 500 MG
2 TABLET ORAL
Qty: 0 | Refills: 0 | DISCHARGE
Start: 2021-06-12

## 2021-06-12 RX ORDER — CLOPIDOGREL BISULFATE 75 MG/1
1 TABLET, FILM COATED ORAL
Qty: 0 | Refills: 0 | DISCHARGE

## 2021-06-12 RX ADMIN — ESCITALOPRAM OXALATE 5 MILLIGRAM(S): 10 TABLET, FILM COATED ORAL at 12:17

## 2021-06-12 RX ADMIN — SODIUM CHLORIDE 3 MILLILITER(S): 9 INJECTION INTRAMUSCULAR; INTRAVENOUS; SUBCUTANEOUS at 14:55

## 2021-06-12 RX ADMIN — Medication 1 TABLET(S): at 12:17

## 2021-06-12 RX ADMIN — SODIUM CHLORIDE 3 MILLILITER(S): 9 INJECTION INTRAMUSCULAR; INTRAVENOUS; SUBCUTANEOUS at 05:31

## 2021-06-12 RX ADMIN — Medication 81 MILLIGRAM(S): at 12:17

## 2021-06-12 RX ADMIN — Medication 100 MILLIGRAM(S): at 05:30

## 2021-06-12 RX ADMIN — CHLORHEXIDINE GLUCONATE 1 APPLICATION(S): 213 SOLUTION TOPICAL at 05:26

## 2021-06-12 RX ADMIN — PANTOPRAZOLE SODIUM 40 MILLIGRAM(S): 20 TABLET, DELAYED RELEASE ORAL at 05:31

## 2021-06-12 RX ADMIN — SODIUM CHLORIDE 100 MILLILITER(S): 9 INJECTION, SOLUTION INTRAVENOUS at 05:27

## 2021-06-12 NOTE — DISCHARGE NOTE NURSING/CASE MANAGEMENT/SOCIAL WORK - PATIENT PORTAL LINK FT
You can access the FollowMyHealth Patient Portal offered by [facility] by registering at the following website: [email or web address]. By joining [email or web address]’s FollowMyHealth portal, you will also be able to view your health information using other applications (apps) compatible with our system.

## 2021-06-12 NOTE — PROGRESS NOTE ADULT - SUBJECTIVE AND OBJECTIVE BOX
s/p TURBT -- POD #1    Patient seen & examined.  No acute events noted overnight.  Patient reports no pain well and feels well.  Remains afebrile.   Saldivar catheter with clear yellow urine.  Denies any urinary complaints.   Tolerates diet without N/V.         I&O's Detail    11 Jun 2021 07:01  -  12 Jun 2021 07:00  --------------------------------------------------------  IN:    Lactated Ringers: 300 mL  Total IN: 300 mL    OUT:    Ureteral Catheter (mL): 1000 mL  Total OUT: 1000 mL    Total NET: -700 mL      12 Jun 2021 07:01  -  12 Jun 2021 11:55  --------------------------------------------------------  IN:  Total IN: 0 mL    OUT:    Ureteral Catheter (mL): 300 mL    Voided (mL): 300 mL  Total OUT: 600 mL    Total NET: -600 mL        Vital Signs Last 24 Hrs  T(C): 36.1 (12 Jun 2021 04:59), Max: 36.6 (11 Jun 2021 18:30)  T(F): 97 (12 Jun 2021 04:59), Max: 97.8 (11 Jun 2021 18:30)  HR: 73 (12 Jun 2021 04:59) (67 - 88)  BP: 108/63 (12 Jun 2021 09:52) (99/58 - 125/78)  RR: 16 (12 Jun 2021 09:52) (16 - 20)  SpO2: 99% (11 Jun 2021 22:00) (95% - 100%)        Physical exam  General: Wd, Wn, conversant in NAD.  Lungs:  Clear to auscultation, No wheezes, rale or rhonchi.  Cor:  S1 & S2, No murmurs, rubs or gallops.  Abdomen:  + BS, soft, no distention, non-tender, No rebound, guarding or peritoneal signs.  Genitourinary:  Saldivar catheter in place draining clear yellow urine.  No suprapubic tenderness, No CVAT.   Extremity:  No clubbing, cyanosis or edema.  No calf tenderness.    Neuro:  No focal deficits.

## 2021-06-12 NOTE — DISCHARGE NOTE PROVIDER - HOSPITAL COURSE
.  * Patient admitted on:  6/11/2021.  * Admission diagnosis:  Sessile posterior bladder wall midtrigone tumor.  * Procedure & Procedure date:  6/11/2021 /p TURBT sessile posterior bladder wall midtrigone tumor completely resected down to muscle, superficial and deep bladder tumor    * HOSPITAL COURSE:   - Ivabx.:  Ceftriaxone.  - Pain medications PRN.  - VTE prophylaxis:   Mechanical only due to recent surgery and high risk for bleed.   - GI prophylaxis:  Protonix & Zofran as needed.  - Serial exams.  - Soft telemetry monitoring overnight.   - Monitored labs.  - Resumed and tolerated diet on 6/11/2021.  - Saldivar discontinued on 6/12/2021 and passed trial of void.    * Discharge date:  6/12/2021 Home with follow up made for 6/22/2021.

## 2021-06-12 NOTE — CHART NOTE - NSCHARTNOTEFT_GEN_A_CORE
.  Patient passed trial of void and urine looks clear yellow without hematuria.  Patient continues to feel well without new complaints.       Case d/w Dr. Haney and states can discharge home today and no need for PO discharge antibiotics.  Dr. Haney states patient to call office on Monday to confirm his appointment for Tuesday June 22nd.    - Patient verbalizes understanding of plan & instructions and all questions answered to patient's satisfaction.     - D/C home today. .  Patient passed trial of void and urine looks clear yellow without hematuria.  Patient continues to feel well without new complaints.       Case d/w Dr. Haney and states can discharge home today and no need for PO discharge antibiotics.  Dr. Haney states patient to call office on Monday to confirm his appointment for Tuesday June 22nd.  Dr. Haney states to continue to hold his Plavix x 1 week and can resume his Aspirin.  Patient instructed to resume his Plavix on 6/19/2021.    - Patient verbalizes understanding of plan & instructions and all questions answered to patient's satisfaction.     - D/C home today.

## 2021-06-12 NOTE — PROGRESS NOTE ADULT - ASSESSMENT
1.  s/p TURBT - per urology, urine clear this morning.    2.  CAD/ICMP/systolic CHF (LVEF=35%)  - s/p PCI LOUIE 5/2020        tele reviewed, no significant arrhythmias overnight.  3 NSVT's at 12:40 AM.  K=4.4, Mg=2.1, insignificant event     clinically pt doing well.  BP this am slightly low, 100/60 by me.      PLAN - Cont on current medications.  May hold Losartan until I see him next week.    From cardiac standpoint pt may be discharged today.  I did tell him to call me with any questions.  Also he will see me in the office next Saturday at 1pm

## 2021-06-12 NOTE — PROGRESS NOTE ADULT - SUBJECTIVE AND OBJECTIVE BOX
Patient is a 65y old  Male who presents with a chief complaint of     HPI:   pt is s/p  surgery, kept overnight for observation  this morning feels good, has no complaints.  denies chest pain, palpit       PAST MEDICAL & SURGICAL HISTORY:  Coronary artery disease  s/p stent May 2020    Hypertension    Dyslipidemia    H/O aortic dissection  1.7CM    Heart attack  5/2020    Anxiety  denies suicide ideation    BPH with urinary obstruction    History of left inguinal hernia repair    S/P coronary angioplasty  stent x1 5/2020    History of appendectomy        PREVIOUS DIAGNOSTIC TESTING:      ECHO  FINDINGS:    STRESS  FINDINGS:    CATHETERIZATION  FINDINGS:    MEDICATIONS  (STANDING):  aspirin enteric coated 81 milliGRAM(s) Oral daily  atorvastatin 80 milliGRAM(s) Oral at bedtime  carvedilol 3.125 milliGRAM(s) Oral every 12 hours  chlorhexidine 4% Liquid 1 Application(s) Topical <User Schedule>  escitalopram 5 milliGRAM(s) Oral daily  lactated ringers. 1000 milliLiter(s) (100 mL/Hr) IV Continuous <Continuous>  lactobacillus acidophilus 1 Tablet(s) Oral daily  losartan 25 milliGRAM(s) Oral daily  pantoprazole    Tablet 40 milliGRAM(s) Oral before breakfast  sodium chloride 0.9% lock flush 3 milliLiter(s) IV Push every 8 hours  spironolactone 25 milliGRAM(s) Oral daily  tamsulosin 0.4 milliGRAM(s) Oral at bedtime    MEDICATIONS  (PRN):  acetaminophen   Tablet .. 650 milliGRAM(s) Oral every 6 hours PRN Mild Pain (1 - 3)  HYDROmorphone  Injectable 0.5 milliGRAM(s) IV Push every 10 minutes PRN Moderate Pain (4 - 6)  metoclopramide Injectable 10 milliGRAM(s) IV Push once PRN Nausea and/or Vomiting  nitroglycerin     SubLingual 0.4 milliGRAM(s) SubLingual every 5 minutes PRN Chest Pain  ondansetron Injectable 4 milliGRAM(s) IV Push once PRN Nausea and/or Vomiting      FAMILY HISTORY:  FH: hypertension (Mother)  Mother    Family history of cerebrovascular accident (CVA) in mother (Mother)        SOCIAL HISTORY:  CIGARETTES:    ALCOHOL:    REVIEW OF SYSTEMS:  CONSTITUTIONAL: No fever, weight loss, or fatigue  NECK: No pain or stiffness  RESPIRATORY: No cough, wheezing, chills or hemoptysis; No shortness of breath  CARDIOVASCULAR: No chest pain, palpitations, dizziness, or leg swelling  GASTROINTESTINAL: No abdominal or epigastric pain. No nausea, vomiting, or hematemesis; No diarrhea or constipation. No melena or hematochezia.  GENITOURINARY: No dysuria, frequency, hematuria, or incontinence  NEUROLOGICAL: No headaches, memory loss, loss of strength, numbness, or tremors  SKIN: No itching, burning, rashes, or lesions   ENDOCRINE: No heat or cold intolerance; No hair loss  MUSCULOSKELETAL: No joint pain or swelling; No muscle, back, or extremity pain  HEME/LYMPH: No easy bruising, or bleeding gums          Vital Signs Last 24 Hrs  T(C): 36.1 (12 Jun 2021 04:59), Max: 36.6 (11 Jun 2021 18:30)  T(F): 97 (12 Jun 2021 04:59), Max: 97.8 (11 Jun 2021 18:30)  HR: 73 (12 Jun 2021 04:59) (67 - 88)  BP: 99/58 (12 Jun 2021 04:59) (99/58 - 139/70)  BP(mean): --  RR: 18 (12 Jun 2021 04:59) (16 - 20)  SpO2: 99% (11 Jun 2021 22:00) (95% - 100%)        PHYSICAL EXAM:  GENERAL: NAD, well-groomed, well-developed  HEAD:  Atraumatic, Normocephalic  NECK: Supple, No JVD, Normal thyroid  NERVOUS SYSTEM:  Alert & Oriented X3, Good concentration  CHEST/LUNG: Clear to percussion bilaterally; No rales, rhonchi, wheezing, or rubs  HEART: Regular rate and rhythm; No murmurs, rubs, or gallops  ABDOMEN: Soft, Nontender, Nondistended; Bowel sounds present  EXTREMITIES:  2+ Peripheral Pulses, No clubbing, cyanosis, or edema  SKIN: No rashes or lesions    INTERPRETATION OF TELEMETRY:    ECG:    I&O's Detail    11 Jun 2021 07:01  -  12 Jun 2021 07:00  --------------------------------------------------------  IN:    Lactated Ringers: 300 mL  Total IN: 300 mL    OUT:    Ureteral Catheter (mL): 1000 mL  Total OUT: 1000 mL    Total NET: -700 mL          LABS:                  I&O's Summary    11 Jun 2021 07:01  -  12 Jun 2021 07:00  --------------------------------------------------------  IN: 300 mL / OUT: 1000 mL / NET: -700 mL        RADIOLOGY & ADDITIONAL STUDIES:

## 2021-06-12 NOTE — DISCHARGE NOTE PROVIDER - NSDCMRMEDTOKEN_GEN_ALL_CORE_FT
acetaminophen 325 mg oral tablet: 2 tab(s) orally every 6 hours, As needed, Mild Pain (1 - 3)  Aspirin Enteric Coated 81 mg oral delayed release tablet: 1 tab(s) orally once a day  carvedilol 3.125 mg oral tablet: 1 tab(s) orally 2 times a day  clopidogrel 75 mg oral tablet: 1 tab(s) orally once a day (start on 6/19/2021)   lactobacillus acidophilus oral capsule: 1 cap(s) orally 2 times a day   Lexapro 5 mg oral tablet: 1 tab(s) orally once a day  Lipitor 80 mg oral tablet: 1 tab(s) orally once a day (at bedtime)  losartan 25 mg oral tablet: 1 tab(s) orally once a day  nitroglycerin 0.4 mg sublingual tablet: 1 tab(s) sublingual every 5 minutes, As Needed  spironolactone 25 mg oral tablet: 1 tab(s) orally once a day   tamsulosin 0.4 mg oral capsule: 1 cap(s) orally once a day (at bedtime)

## 2021-06-12 NOTE — DISCHARGE NOTE PROVIDER - NSDCCPTREATMENT_GEN_ALL_CORE_FT
PRINCIPAL PROCEDURE  Procedure: Cystourethroscopy with resection of large tumor of bladder  Findings and Treatment: .  1. Hold Plavix and do not restart until June 19, 2021.  2. No strenuous activity until cleared by Dr. Haney.  3. Continue Flomax.  4. Follow up with Dr. Haney on June 22, 2022.

## 2021-06-12 NOTE — DISCHARGE NOTE PROVIDER - NSDCCPCAREPLAN_GEN_ALL_CORE_FT
PRINCIPAL DISCHARGE DIAGNOSIS  Diagnosis: Mass of urinary bladder  Assessment and Plan of Treatment: .  s/p TURBT on 6/11/2021.  1. Hold Plavix and do not restart until June 19, 2021.  2. No strenuous activity until cleared by Dr. Haney.  3. Continue Flomax.  4. Follow up with Dr. Haney on June 22, 2022.

## 2021-06-12 NOTE — DISCHARGE NOTE PROVIDER - CARE PROVIDER_API CALL
Van Haney)  Urology  [address] [place] [address], [address]  [place] [address]  Phone: [phone]  Fax: [phone]  Scheduled Appointment: 06/22/2021

## 2021-06-12 NOTE — PROGRESS NOTE ADULT - ASSESSMENT
Impression:    64 y/o male s/p TURBT -- POD #1        Plan:  - As per Dr. Haney,  discontinue Saldivar catheter and trial of void.  If passes TOV, then I asked patient and RN to save the urine so I can show Dr. Haney the urine color.  If passed TOV and urine clear, then will discharge home today without antibiotics.   - Ivabx -- Currently on Cefazolin.  - Continue Regular diet.  - Encouraged OOB to chair and ambulate.   - Will follow and if passes TOV, then will discharge home later today.   - Case d/w Dr. Haney.

## 2021-06-16 LAB — SURGICAL PATHOLOGY STUDY: SIGNIFICANT CHANGE UP

## 2021-06-17 NOTE — ED ADULT NURSE NOTE - NSFALLRSKASSESASSIST_ED_ALL_ED
He will need more labs a few days before the appt.- non fasting and random urine. Orders placed.    no

## 2021-06-19 RX ORDER — CLOPIDOGREL BISULFATE 75 MG/1
1 TABLET, FILM COATED ORAL
Qty: 0 | Refills: 0 | DISCHARGE
Start: 2021-06-19

## 2021-06-21 DIAGNOSIS — I47.1 SUPRAVENTRICULAR TACHYCARDIA: ICD-10-CM

## 2021-06-21 DIAGNOSIS — N40.1 BENIGN PROSTATIC HYPERPLASIA WITH LOWER URINARY TRACT SYMPTOMS: ICD-10-CM

## 2021-06-21 DIAGNOSIS — D49.4 NEOPLASM OF UNSPECIFIED BEHAVIOR OF BLADDER: ICD-10-CM

## 2021-06-21 DIAGNOSIS — Z96.5 PRESENCE OF TOOTH-ROOT AND MANDIBULAR IMPLANTS: ICD-10-CM

## 2021-06-21 DIAGNOSIS — E78.5 HYPERLIPIDEMIA, UNSPECIFIED: ICD-10-CM

## 2021-06-21 DIAGNOSIS — Z82.3 FAMILY HISTORY OF STROKE: ICD-10-CM

## 2021-06-21 DIAGNOSIS — Z87.891 PERSONAL HISTORY OF NICOTINE DEPENDENCE: ICD-10-CM

## 2021-06-21 DIAGNOSIS — I11.0 HYPERTENSIVE HEART DISEASE WITH HEART FAILURE: ICD-10-CM

## 2021-06-21 DIAGNOSIS — F41.9 ANXIETY DISORDER, UNSPECIFIED: ICD-10-CM

## 2021-06-21 DIAGNOSIS — N13.8 OTHER OBSTRUCTIVE AND REFLUX UROPATHY: ICD-10-CM

## 2021-06-21 DIAGNOSIS — I50.20 UNSPECIFIED SYSTOLIC (CONGESTIVE) HEART FAILURE: ICD-10-CM

## 2021-06-21 DIAGNOSIS — I25.2 OLD MYOCARDIAL INFARCTION: ICD-10-CM

## 2021-06-21 DIAGNOSIS — Z82.49 FAMILY HISTORY OF ISCHEMIC HEART DISEASE AND OTHER DISEASES OF THE CIRCULATORY SYSTEM: ICD-10-CM

## 2021-06-21 DIAGNOSIS — Z95.5 PRESENCE OF CORONARY ANGIOPLASTY IMPLANT AND GRAFT: ICD-10-CM

## 2021-06-21 DIAGNOSIS — I25.5 ISCHEMIC CARDIOMYOPATHY: ICD-10-CM

## 2021-06-21 DIAGNOSIS — I25.10 ATHEROSCLEROTIC HEART DISEASE OF NATIVE CORONARY ARTERY WITHOUT ANGINA PECTORIS: ICD-10-CM

## 2021-06-22 ENCOUNTER — APPOINTMENT (OUTPATIENT)
Dept: UROLOGY | Facility: CLINIC | Age: 66
End: 2021-06-22
Payer: COMMERCIAL

## 2021-06-22 DIAGNOSIS — D49.4 NEOPLASM OF UNSPECIFIED BEHAVIOR OF BLADDER: ICD-10-CM

## 2021-06-22 PROCEDURE — 99214 OFFICE O/P EST MOD 30 MIN: CPT

## 2021-06-22 RX ORDER — CIPROFLOXACIN HYDROCHLORIDE 500 MG/1
500 TABLET, FILM COATED ORAL
Qty: 14 | Refills: 0 | Status: COMPLETED | COMMUNITY
Start: 2021-04-08 | End: 2021-06-22

## 2021-06-22 NOTE — HISTORY OF PRESENT ILLNESS
[FreeTextEntry1] : ABDIFATAH EID is a 65 year old male on AC, asa, former smoker who presents for consultation for gross hematuria and acute urinary retention. 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 No other evidence of acute abdominal pelvic pathology.  CT scan images visualized with patient prostate is moderately enlarged though I do not appreciate significant intravesical lobe.normal kidneys\par \par Denies  PMH of recurrent UTIs.\par Family History: No  malignancies\par Social History: Former cigarette smoker, denies EtOH abuse or illicit drug use.\par \par Old records reviewed\par Labs from 4/4/2021:\par CBC within normal limits\par BMP demonstrates a creatinine of 0.9 with an estimated GFR of 89\par Urine testing demonstrates greater than 720 RBCs\par Culture negative\par \par

## 2021-06-22 NOTE — ASSESSMENT
[FreeTextEntry1] : ABDIFATAH EID is a 65 year old male on AC, asa, former smoker who presents for consultation for gross hematuria and acute urinary retention. 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

## 2021-06-25 ENCOUNTER — EMERGENCY (EMERGENCY)
Facility: HOSPITAL | Age: 66
LOS: 0 days | Discharge: HOME | End: 2021-06-26
Attending: EMERGENCY MEDICINE | Admitting: EMERGENCY MEDICINE
Payer: COMMERCIAL

## 2021-06-25 ENCOUNTER — NON-APPOINTMENT (OUTPATIENT)
Age: 66
End: 2021-06-25

## 2021-06-25 VITALS
RESPIRATION RATE: 18 BRPM | DIASTOLIC BLOOD PRESSURE: 74 MMHG | WEIGHT: 156.97 LBS | SYSTOLIC BLOOD PRESSURE: 159 MMHG | OXYGEN SATURATION: 99 % | HEIGHT: 68 IN | HEART RATE: 89 BPM | TEMPERATURE: 96 F

## 2021-06-25 DIAGNOSIS — Z95.5 PRESENCE OF CORONARY ANGIOPLASTY IMPLANT AND GRAFT: ICD-10-CM

## 2021-06-25 DIAGNOSIS — Z85.51 PERSONAL HISTORY OF MALIGNANT NEOPLASM OF BLADDER: ICD-10-CM

## 2021-06-25 DIAGNOSIS — Z90.49 ACQUIRED ABSENCE OF OTHER SPECIFIED PARTS OF DIGESTIVE TRACT: Chronic | ICD-10-CM

## 2021-06-25 DIAGNOSIS — R31.9 HEMATURIA, UNSPECIFIED: ICD-10-CM

## 2021-06-25 DIAGNOSIS — I10 ESSENTIAL (PRIMARY) HYPERTENSION: ICD-10-CM

## 2021-06-25 DIAGNOSIS — E78.5 HYPERLIPIDEMIA, UNSPECIFIED: ICD-10-CM

## 2021-06-25 DIAGNOSIS — Z90.49 ACQUIRED ABSENCE OF OTHER SPECIFIED PARTS OF DIGESTIVE TRACT: ICD-10-CM

## 2021-06-25 DIAGNOSIS — Z98.61 CORONARY ANGIOPLASTY STATUS: Chronic | ICD-10-CM

## 2021-06-25 DIAGNOSIS — Z79.02 LONG TERM (CURRENT) USE OF ANTITHROMBOTICS/ANTIPLATELETS: ICD-10-CM

## 2021-06-25 DIAGNOSIS — Z79.82 LONG TERM (CURRENT) USE OF ASPIRIN: ICD-10-CM

## 2021-06-25 DIAGNOSIS — I25.10 ATHEROSCLEROTIC HEART DISEASE OF NATIVE CORONARY ARTERY WITHOUT ANGINA PECTORIS: ICD-10-CM

## 2021-06-25 DIAGNOSIS — N40.0 BENIGN PROSTATIC HYPERPLASIA WITHOUT LOWER URINARY TRACT SYMPTOMS: ICD-10-CM

## 2021-06-25 DIAGNOSIS — I25.2 OLD MYOCARDIAL INFARCTION: ICD-10-CM

## 2021-06-25 DIAGNOSIS — Z98.890 OTHER SPECIFIED POSTPROCEDURAL STATES: Chronic | ICD-10-CM

## 2021-06-25 LAB
ALBUMIN SERPL ELPH-MCNC: 4.2 G/DL — SIGNIFICANT CHANGE UP (ref 3.5–5.2)
ALP SERPL-CCNC: 50 U/L — SIGNIFICANT CHANGE UP (ref 30–115)
ALT FLD-CCNC: 20 U/L — SIGNIFICANT CHANGE UP (ref 0–41)
ANION GAP SERPL CALC-SCNC: 12 MMOL/L — SIGNIFICANT CHANGE UP (ref 7–14)
AST SERPL-CCNC: 21 U/L — SIGNIFICANT CHANGE UP (ref 0–41)
BASOPHILS # BLD AUTO: 0.02 K/UL — SIGNIFICANT CHANGE UP (ref 0–0.2)
BASOPHILS NFR BLD AUTO: 0.2 % — SIGNIFICANT CHANGE UP (ref 0–1)
BILIRUB SERPL-MCNC: 0.6 MG/DL — SIGNIFICANT CHANGE UP (ref 0.2–1.2)
BUN SERPL-MCNC: 20 MG/DL — SIGNIFICANT CHANGE UP (ref 10–20)
CALCIUM SERPL-MCNC: 9.1 MG/DL — SIGNIFICANT CHANGE UP (ref 8.5–10.1)
CHLORIDE SERPL-SCNC: 105 MMOL/L — SIGNIFICANT CHANGE UP (ref 98–110)
CO2 SERPL-SCNC: 19 MMOL/L — SIGNIFICANT CHANGE UP (ref 17–32)
CREAT SERPL-MCNC: 0.8 MG/DL — SIGNIFICANT CHANGE UP (ref 0.7–1.5)
EOSINOPHIL # BLD AUTO: 0.02 K/UL — SIGNIFICANT CHANGE UP (ref 0–0.7)
EOSINOPHIL NFR BLD AUTO: 0.2 % — SIGNIFICANT CHANGE UP (ref 0–8)
GLUCOSE SERPL-MCNC: 120 MG/DL — HIGH (ref 70–99)
HCT VFR BLD CALC: 38.9 % — LOW (ref 42–52)
HGB BLD-MCNC: 13.4 G/DL — LOW (ref 14–18)
IMM GRANULOCYTES NFR BLD AUTO: 0.4 % — HIGH (ref 0.1–0.3)
LYMPHOCYTES # BLD AUTO: 1.48 K/UL — SIGNIFICANT CHANGE UP (ref 1.2–3.4)
LYMPHOCYTES # BLD AUTO: 13.1 % — LOW (ref 20.5–51.1)
MCHC RBC-ENTMCNC: 31.4 PG — HIGH (ref 27–31)
MCHC RBC-ENTMCNC: 34.4 G/DL — SIGNIFICANT CHANGE UP (ref 32–37)
MCV RBC AUTO: 91.1 FL — SIGNIFICANT CHANGE UP (ref 80–94)
MONOCYTES # BLD AUTO: 0.5 K/UL — SIGNIFICANT CHANGE UP (ref 0.1–0.6)
MONOCYTES NFR BLD AUTO: 4.4 % — SIGNIFICANT CHANGE UP (ref 1.7–9.3)
NEUTROPHILS # BLD AUTO: 9.22 K/UL — HIGH (ref 1.4–6.5)
NEUTROPHILS NFR BLD AUTO: 81.7 % — HIGH (ref 42.2–75.2)
NRBC # BLD: 0 /100 WBCS — SIGNIFICANT CHANGE UP (ref 0–0)
PLATELET # BLD AUTO: 182 K/UL — SIGNIFICANT CHANGE UP (ref 130–400)
POTASSIUM SERPL-MCNC: 3.9 MMOL/L — SIGNIFICANT CHANGE UP (ref 3.5–5)
POTASSIUM SERPL-SCNC: 3.9 MMOL/L — SIGNIFICANT CHANGE UP (ref 3.5–5)
PROT SERPL-MCNC: 6.5 G/DL — SIGNIFICANT CHANGE UP (ref 6–8)
RBC # BLD: 4.27 M/UL — LOW (ref 4.7–6.1)
RBC # FLD: 12.5 % — SIGNIFICANT CHANGE UP (ref 11.5–14.5)
SODIUM SERPL-SCNC: 136 MMOL/L — SIGNIFICANT CHANGE UP (ref 135–146)
WBC # BLD: 11.28 K/UL — HIGH (ref 4.8–10.8)
WBC # FLD AUTO: 11.28 K/UL — HIGH (ref 4.8–10.8)

## 2021-06-25 PROCEDURE — 99284 EMERGENCY DEPT VISIT MOD MDM: CPT

## 2021-06-25 RX ORDER — SODIUM CHLORIDE 9 MG/ML
1000 INJECTION, SOLUTION INTRAVENOUS ONCE
Refills: 0 | Status: COMPLETED | OUTPATIENT
Start: 2021-06-25 | End: 2021-06-25

## 2021-06-25 RX ADMIN — SODIUM CHLORIDE 1000 MILLILITER(S): 9 INJECTION, SOLUTION INTRAVENOUS at 21:24

## 2021-06-25 NOTE — ED PROVIDER NOTE - NSFOLLOWUPINSTRUCTIONS_ED_ALL_ED_FT
Transurethral Resection of Bladder Tumors    WHAT YOU NEED TO KNOW:    What do I need to know about a transurethral resection of bladder tumors? Transurethral resection of bladder tumors (TURBT) is surgery to remove one or more tumors from your bladder.    Kidney, Ureters, Bladder         How do I prepare for TURBT?   •Your surgeon will talk to you about how to prepare. He or she may tell you not to eat or drink anything after midnight on the day of your surgery. Arrange to have someone drive you home from surgery.      •Tell your surgeon about all medicines you currently take. He or she will tell you if you need to stop any medicine for surgery, and when to stop. He or she will tell you which medicines to take or not take on the day of surgery.      •You may need blood or urine tests before your surgery. You may also need a CT scan or a cystoscopy. Antibiotics may be given to prevent or treat a bacterial infection.      •An IV will be put into a vein. You may be given liquids or medicine through the IV.      •General anesthesia may be given to keep you asleep and free from pain during surgery. Spinal or epidural anesthesia may instead be given to numb the area. You may still feel pressure or pushing during surgery, but you should not feel pain. Healthcare providers will talk to you about which kind of anesthesia is right for you.      What will happen during TURBT? Your surgeon will insert a scope through your urethra and into your bladder. He or she will put fluid through the scope to wash your bladder and widen it for surgery. The scope will have a wire with an electric current. The current is used to stop bleeding in your bladder and remove bladder tumors. Your surgeon may also remove muscle and tissue from your bladder. He or she may use the scope to insert medicine into your bladder. The medicine will destroy pieces of tumor in your bladder and help prevent new tumors from growing.    What should I expect after TURBT? You will be taken to a room to rest until you are fully awake. You will be monitored closely for any problems. Do not get out of bed until your healthcare provider says it is okay. You will then be able to go home or be taken to your hospital room.   •A Saldivar catheter is a tube put into your bladder to drain urine into a bag. Keep the bag below your waist. This will prevent urine from flowing back into your bladder and causing an infection or other problems. Also, keep the tube free of kinks so the urine will drain properly. Do not pull on the catheter. This can cause pain and bleeding, and may cause the catheter to come out.       •Medicines may be given to relieve or treat pain or nausea. Antibiotics may be given to prevent a bacterial infection.      What are the risks of TURBT? You may bleed more than expected or get an infection. Your bladder may be damaged. It may be painful to urinate, or you may have blood in your urine. You may feel discomfort in your abdomen or pelvis. You may feel like you need to urinate more often or without warning. You may develop more bladder tumors.    CARE AGREEMENT:    You have the right to help plan your care. Learn about your health condition and how it may be treated. Discuss treatment options with your healthcare providers to decide what care you want to receive. You always have the right to refuse treatment. Hematuria    WHAT YOU NEED TO KNOW:    What is hematuria? Hematuria is blood in your urine. Your urine may be bright red to dark brown.    What other signs and symptoms might I have with hematuria?   •Fever      •Nausea and vomiting      •Pain or bruising on your lower back or sides      •Pain or burning when you urinate      •More urination than usual, or the need to urinate right away      •Blood clots in the toilet after you urinate      What causes hematuria? Ask your healthcare provider for more information about these and other causes of hematuria:  •Urinary tract infection      •Kidney or bladder stones      •Swollen prostate      •Kidney disease      •Abdomen or pelvic injury      •Kidney, bladder, or prostate cancer      •Intense exercise      How is hematuria diagnosed? Your healthcare provider will ask when you first saw a change in the color of your urine. Tell him or her about any medical conditions or medicines you take. Some medicines can damage your kidneys or increase your risk for bleeding. You may need any of the following:  •Blood and urine tests may show infection and how well your kidneys are working.      •An ultrasound or CT may show the cause of your hematuria. You may be given contrast liquid to help your urinary tract show up better in the pictures. Tell the healthcare provider if you have ever had an allergic reaction to contrast liquid.      •A cystoscopy may show problems inside your bladder. The cystoscope is a long tube with a lens and a light on the end.      How is hematuria treated? Hematuria may go away without treatment. You may need medicines to treat an infection. Treatment depends on the cause of your hematuria. Ask your healthcare provider for more information about the treatment you may need.    How can I manage my symptoms? Drink liquids as directed. You may need to drink extra liquids to help flush the blood from your body through your urine. Water is the best liquid to drink. Ask how much liquid to drink each day and which liquids are best for you.    When should I seek immediate care?   •You have blood in your urine after a new injury, such as a fall.      •You have severe back or side pain that does not go away with treatment.      When should I call my doctor?   •You are urinating very small amounts or not at all.      •You feel like you cannot empty your bladder.      •You have a fever that gets worse or does not go away with treatment.      •You cannot keep liquids or medicines down.      •Your urine gets darker, even after you drink extra liquids.      •You have questions or concerns about your condition, treatment, or care.      CARE AGREEMENT:    You have the right to help plan your care. Learn about your health condition and how it may be treated. Discuss treatment options with your healthcare providers to decide what care you want to receive. You always have the right to refuse treatment.

## 2021-06-25 NOTE — ED PROVIDER NOTE - PATIENT PORTAL LINK FT
You can access the FollowMyHealth Patient Portal offered by [facility] by registering at the following website: [email or web address]. By joining [facility]’s FollowMyHealth portal, you will also be able to view your health information using other applications (apps) compatible with our system.

## 2021-06-25 NOTE — CONSULT NOTE ADULT - ASSESSMENT
64y/o M on ASA/Plavix s/p TURBT on 6/11/21 presenting with hematuria and clots, improving     - Case d/w Dr. Haney, plan as per attending.  - No acute  intervention at this time   - Return precautions given if unable to void after discharge, may need yeboah placement and irrigation depending on prevoid volume.  - Recommending to stop Plavix for at least two weeks.

## 2021-06-25 NOTE — ED PROVIDER NOTE - CLINICAL SUMMARY MEDICAL DECISION MAKING FREE TEXT BOX
66 yo M presented to Ed for hematuria. Labs WNL. No UTI. Discussed case with urology and pt can fu as outpatient

## 2021-06-25 NOTE — ED PROVIDER NOTE - CONDITION AT DISCHARGE:
WOUND HEALING BY SECONDARY INTENTION; HANGING DRIED DEAD SKIN WAS EXCISED.  PLEASE CLEAN AND DRESS PATIENT'S WOUND WITH ANTIBIOTIC OINTMENT  PLEASE PLACE RIGHT THUMB IN FROG SPLINT FOR PROTECTION  Please take medications as directed.  Supportive care and monitoring, and Follow up with your PCp or Go to ER if it is needed as discussed.    Patient Education     Wound Check, No Infection  Your wound is healing as expected. There are no signs of infection.   Home care  Continue to care for your wound as directed.  · Cover your wound with a bandage unless your healthcare provider tells you not to.  · Gently clean your wound with soap and water when you shower.   · Unless told otherwise, don't swim or take tub baths until your wound has healed.  Follow-up care  Follow up with your healthcare provider as advised.  · If you have sutures or staples, return as directed to have them removed. If they are not taken out on time, they may be harder to remove and scarring may be worse. Infection may develop.  · If surgical tape strips were used, you can remove them yourself if they have not fallen off by 10 days after they were applied.   When to seek medical advice  Call your healthcare provider right away if any of these occur:  · Fever of 100.4ºF (38ºC) or higher, or as directed by your healthcare provider  · Symptoms of a wound infection, including:  ? Redness or swelling around the wound  ? Warmth coming from the wound  ? New or worsening pain  ? Red streaks around the wound  ? Draining pus  Date Last Reviewed: 3/1/2018  © 3417-7654 [employer]. [address] [place] [address], [place] [address]. All rights reserved. This information is not intended as a substitute for professional medical care. Always follow your healthcare professional's instructions.           
Improved

## 2021-06-25 NOTE — CONSULT NOTE ADULT - SUBJECTIVE AND OBJECTIVE BOX
HPI: Pt is a 66y/o M on ASA/Plavix s/p TURBT on 6/11/21 presenting with hematuria and clots. Pt states his urinary stream earlier in the day was weak and he had to strain to void with clots. He drank plenty of fluids, and came to the ED. Denies fevers, nausea, vomiting, flank pain, frequency, dysuria, or urgency at this time. ED did bedside US which showed 135cc prevoid; pt subsequently went to bathroom to give urine specimen, states he had a strong stream, hematuric, noted a few tiny clots in toilet bowl.    PAST MEDICAL & SURGICAL HISTORY:  Coronary artery disease  s/p stent May 2020  Hypertension  Dyslipidemia  H/O aortic dissection  1.7CM  Heart attack  5/2020  Anxiety  denies suicide ideation  BPH with urinary obstruction  History of left inguinal hernia repair  S/P coronary angioplasty  stent x1 5/2020  History of appendectomy    REVIEW OF SYSTEMS:  CONSTITUTIONAL:  No fevers or chills  HEENT: No visual changes  ENDO: No sweating  NECK: No pain or stiffness  MUSCULOSKELETAL: No back pain, no joint pain  RESPIRATORY: No shortness of breath  CARDIOVASCULAR: No chest pain  GASTROINTESTINAL: No abdominal or epigastric pain. No nausea, vomiting,  No diarrhea or constipation.   NEUROLOGICAL: No mental status changes  PSYCH: No depression, no mood changes  SKIN: No itching    Allergies  No Known Allergies    SOCIAL HISTORY: No illicit drug use    FAMILY HISTORY:  FH: hypertension (Mother)  Mother  Family history of cerebrovascular accident (CVA) in mother (Mother)    Vital Signs Last 24 Hrs  T(C): 35.8 (25 Jun 2021 18:31), Max: 35.8 (25 Jun 2021 18:31)  T(F): 96.4 (25 Jun 2021 18:31), Max: 96.4 (25 Jun 2021 18:31)  HR: 89 (25 Jun 2021 18:31) (89 - 89)  BP: 159/74 (25 Jun 2021 18:31) (159/74 - 159/74)  RR: 18 (25 Jun 2021 18:31) (18 - 18)  SpO2: 99% (25 Jun 2021 18:31) (99% - 99%)    PHYSICAL EXAM:  Constitutional: NAD, well-developed  HEENT: EOMI  Neck: no pain  Back: No CVA tenderness  Respiratory: No accessory respiratory muscle use  Abd: Soft, NT/ND  no organomegally  no hernia  : bladder nonpalpable on palpation, very mild suprapubic tenderness  Extremities: no edema  Neurological: A/O x 3  Psychiatric: Normal mood, normal affect  Skin: No rashes    LABS:             13.4   11.28 )-----------( 182      ( 25 Jun 2021 20:55 )             38.9     136  |  105  |  20  ----------------------------<  120<H>  3.9   |  19  |  0.8    Ca    9.1      25 Jun 2021 20:55  TPro  6.5  /  Alb  4.2  /  TBili  0.6  /  DBili  x   /  AST  21  /  ALT  20  /  AlkPhos  50  06-25

## 2021-06-25 NOTE — ED PROVIDER NOTE - PHYSICAL EXAMINATION
CONSTITUTIONAL: Well-developed; well-nourished; in no acute distress.   SKIN: warm, dry  HEAD: Normocephalic; atraumatic.  EYES: no conjunctival injection. PERRLA. EOMI.   ENT: No nasal discharge; airway clear.  NECK: Supple; non tender.  CARD: S1, S2 normal; no murmurs, gallops, or rubs. Regular rate and rhythm.   RESP: No wheezes, rales or rhonchi.  ABD: +suprapubic tenderness. soft nd. BS+ in all 4 quadrants.  EXT: Normal ROM.  No clubbing, cyanosis or edema.   NEURO: Alert, oriented, grossly unremarkable.  PSYCH: Cooperative, appropriate.

## 2021-06-25 NOTE — ED PROVIDER NOTE - ATTENDING CONTRIBUTION TO CARE
66 yo M with PMH of CAD, bladder mass sp prostatomy 2 weeks ago presents to ED for hematuria that started yesterday. + blood clots. No dysuria. No abdominal pain.

## 2021-06-25 NOTE — ED PROVIDER NOTE - CARE PROVIDER_API CALL
Van Haney)  Urology  [address] [place] [address], [address]  [place] [address]  Phone: [phone]  Fax: [phone]  Follow Up Time: 1-3 Days

## 2021-06-25 NOTE — ED PROVIDER NOTE - NS ED ROS FT
GEN:  no fever, no chills, no generalized weakness  NEURO:  no headache, no dizziness  ENT: no sore throat, no runny nose  CV:  no chest pain, no palpitations  RESP:  no sob, no cough  GI:  no nausea, no vomiting, +lower abdominal pain, no diarrhea  :  no dysuria, no urinary frequency, +hematuria  MSK:  no joint pain, no edema  SKIN:  no rash, no bruising  HEME: no hematochezia, no melena

## 2021-06-25 NOTE — ED ADULT TRIAGE NOTE - CHIEF COMPLAINT QUOTE
Patient c/o hematuria and pelvic pain and difficulty urinating x 1 days. Patient had prostate removed 2 weeks ago

## 2021-06-25 NOTE — ED PROVIDER NOTE - OBJECTIVE STATEMENT
64 yo male, no PMHx, presents with hematuria, second day, associated with suprapubic pain, non-radiating, constant, no aggravating or alleviating factors. Denies fever, chest pain, shortness of breath 64 yo male, no PMHx, presents with hematuria with debris in urine, second day, associated with suprapubic pain, non-radiating, constant, no aggravating or alleviating factors. Denies fever, chest pain, shortness of breath, nausea, vomiting, diarrhea, constipation, melena. 66 yo male, PMHx of DLD, CAD, MI, aortic dissection, presents with hematuria with debris in urine, second day, associated with suprapubic pain, non-radiating, constant, no aggravating or alleviating factors. He had a TURBT performed 2 weeks prior by Dr. Haney. Denies fever, chest pain, shortness of breath, nausea, vomiting, diarrhea, constipation, melena.

## 2021-06-26 LAB
APPEARANCE UR: CLEAR — SIGNIFICANT CHANGE UP
BACTERIA # UR AUTO: NEGATIVE — SIGNIFICANT CHANGE UP
BILIRUB UR-MCNC: NEGATIVE — SIGNIFICANT CHANGE UP
COLOR SPEC: SIGNIFICANT CHANGE UP
DIFF PNL FLD: ABNORMAL
EPI CELLS # UR: 1 /HPF — SIGNIFICANT CHANGE UP (ref 0–5)
GLUCOSE UR QL: NEGATIVE — SIGNIFICANT CHANGE UP
HYALINE CASTS # UR AUTO: 0 /LPF — SIGNIFICANT CHANGE UP (ref 0–7)
KETONES UR-MCNC: ABNORMAL
LEUKOCYTE ESTERASE UR-ACNC: NEGATIVE — SIGNIFICANT CHANGE UP
NITRITE UR-MCNC: NEGATIVE — SIGNIFICANT CHANGE UP
PH UR: 6 — SIGNIFICANT CHANGE UP (ref 5–8)
PROT UR-MCNC: SIGNIFICANT CHANGE UP
RBC CASTS # UR COMP ASSIST: 9 /HPF — HIGH (ref 0–4)
SP GR SPEC: 1.01 — SIGNIFICANT CHANGE UP (ref 1.01–1.03)
UROBILINOGEN FLD QL: SIGNIFICANT CHANGE UP
WBC UR QL: 3 /HPF — SIGNIFICANT CHANGE UP (ref 0–5)

## 2021-06-26 NOTE — ED ADULT NURSE NOTE - OBJECTIVE STATEMENT
64 yo male, PMHx of DLD, CAD, MI, aortic dissection, presents with hematuria with debris in urine, second day, associated with suprapubic pain, non-radiating, constant, no aggravating or alleviating factors. He had a TURBT performed 2 weeks prior by Dr. Haney. Denies fever, chest pain, shortness of breath, nausea, vomiting, diarrhea, constipation, melena.

## 2021-06-27 ENCOUNTER — EMERGENCY (EMERGENCY)
Facility: HOSPITAL | Age: 66
LOS: 0 days | Discharge: HOME | End: 2021-06-27
Attending: EMERGENCY MEDICINE | Admitting: EMERGENCY MEDICINE
Payer: COMMERCIAL

## 2021-06-27 VITALS
HEIGHT: 68 IN | RESPIRATION RATE: 20 BRPM | SYSTOLIC BLOOD PRESSURE: 109 MMHG | WEIGHT: 149.91 LBS | HEART RATE: 61 BPM | OXYGEN SATURATION: 99 % | TEMPERATURE: 99 F | DIASTOLIC BLOOD PRESSURE: 65 MMHG

## 2021-06-27 DIAGNOSIS — I25.10 ATHEROSCLEROTIC HEART DISEASE OF NATIVE CORONARY ARTERY WITHOUT ANGINA PECTORIS: ICD-10-CM

## 2021-06-27 DIAGNOSIS — I10 ESSENTIAL (PRIMARY) HYPERTENSION: ICD-10-CM

## 2021-06-27 DIAGNOSIS — Z98.61 CORONARY ANGIOPLASTY STATUS: Chronic | ICD-10-CM

## 2021-06-27 DIAGNOSIS — R31.9 HEMATURIA, UNSPECIFIED: ICD-10-CM

## 2021-06-27 DIAGNOSIS — E78.5 HYPERLIPIDEMIA, UNSPECIFIED: ICD-10-CM

## 2021-06-27 DIAGNOSIS — Z86.79 PERSONAL HISTORY OF OTHER DISEASES OF THE CIRCULATORY SYSTEM: ICD-10-CM

## 2021-06-27 DIAGNOSIS — Z79.82 LONG TERM (CURRENT) USE OF ASPIRIN: ICD-10-CM

## 2021-06-27 DIAGNOSIS — Z87.891 PERSONAL HISTORY OF NICOTINE DEPENDENCE: ICD-10-CM

## 2021-06-27 DIAGNOSIS — Z79.899 OTHER LONG TERM (CURRENT) DRUG THERAPY: ICD-10-CM

## 2021-06-27 DIAGNOSIS — Z79.02 LONG TERM (CURRENT) USE OF ANTITHROMBOTICS/ANTIPLATELETS: ICD-10-CM

## 2021-06-27 DIAGNOSIS — Z90.49 ACQUIRED ABSENCE OF OTHER SPECIFIED PARTS OF DIGESTIVE TRACT: Chronic | ICD-10-CM

## 2021-06-27 DIAGNOSIS — Z90.79 ACQUIRED ABSENCE OF OTHER GENITAL ORGAN(S): ICD-10-CM

## 2021-06-27 DIAGNOSIS — Z98.890 OTHER SPECIFIED POSTPROCEDURAL STATES: Chronic | ICD-10-CM

## 2021-06-27 DIAGNOSIS — Z98.890 OTHER SPECIFIED POSTPROCEDURAL STATES: ICD-10-CM

## 2021-06-27 LAB
ALBUMIN SERPL ELPH-MCNC: 4.2 G/DL — SIGNIFICANT CHANGE UP (ref 3.5–5.2)
ALP SERPL-CCNC: 43 U/L — SIGNIFICANT CHANGE UP (ref 30–115)
ALT FLD-CCNC: 16 U/L — SIGNIFICANT CHANGE UP (ref 0–41)
ANION GAP SERPL CALC-SCNC: 11 MMOL/L — SIGNIFICANT CHANGE UP (ref 7–14)
APPEARANCE UR: CLEAR — SIGNIFICANT CHANGE UP
APTT BLD: 30.3 SEC — SIGNIFICANT CHANGE UP (ref 27–39.2)
AST SERPL-CCNC: 17 U/L — SIGNIFICANT CHANGE UP (ref 0–41)
BACTERIA # UR AUTO: NEGATIVE — SIGNIFICANT CHANGE UP
BASOPHILS # BLD AUTO: 0.03 K/UL — SIGNIFICANT CHANGE UP (ref 0–0.2)
BASOPHILS NFR BLD AUTO: 0.4 % — SIGNIFICANT CHANGE UP (ref 0–1)
BILIRUB SERPL-MCNC: 0.5 MG/DL — SIGNIFICANT CHANGE UP (ref 0.2–1.2)
BILIRUB UR-MCNC: NEGATIVE — SIGNIFICANT CHANGE UP
BLD GP AB SCN SERPL QL: SIGNIFICANT CHANGE UP
BUN SERPL-MCNC: 14 MG/DL — SIGNIFICANT CHANGE UP (ref 10–20)
CALCIUM SERPL-MCNC: 9.1 MG/DL — SIGNIFICANT CHANGE UP (ref 8.5–10.1)
CHLORIDE SERPL-SCNC: 103 MMOL/L — SIGNIFICANT CHANGE UP (ref 98–110)
CO2 SERPL-SCNC: 23 MMOL/L — SIGNIFICANT CHANGE UP (ref 17–32)
COLOR SPEC: ABNORMAL
CREAT SERPL-MCNC: 0.8 MG/DL — SIGNIFICANT CHANGE UP (ref 0.7–1.5)
DIFF PNL FLD: ABNORMAL
EOSINOPHIL # BLD AUTO: 0.11 K/UL — SIGNIFICANT CHANGE UP (ref 0–0.7)
EOSINOPHIL NFR BLD AUTO: 1.3 % — SIGNIFICANT CHANGE UP (ref 0–8)
EPI CELLS # UR: 0 /HPF — SIGNIFICANT CHANGE UP (ref 0–5)
GLUCOSE SERPL-MCNC: 94 MG/DL — SIGNIFICANT CHANGE UP (ref 70–99)
GLUCOSE UR QL: NEGATIVE — SIGNIFICANT CHANGE UP
HCT VFR BLD CALC: 35.3 % — LOW (ref 42–52)
HGB BLD-MCNC: 12 G/DL — LOW (ref 14–18)
HYALINE CASTS # UR AUTO: 0 /LPF — SIGNIFICANT CHANGE UP (ref 0–7)
IMM GRANULOCYTES NFR BLD AUTO: 0.4 % — HIGH (ref 0.1–0.3)
INR BLD: 1.05 RATIO — SIGNIFICANT CHANGE UP (ref 0.65–1.3)
KETONES UR-MCNC: NEGATIVE — SIGNIFICANT CHANGE UP
LEUKOCYTE ESTERASE UR-ACNC: NEGATIVE — SIGNIFICANT CHANGE UP
LYMPHOCYTES # BLD AUTO: 3.03 K/UL — SIGNIFICANT CHANGE UP (ref 1.2–3.4)
LYMPHOCYTES # BLD AUTO: 36.2 % — SIGNIFICANT CHANGE UP (ref 20.5–51.1)
MCHC RBC-ENTMCNC: 31.4 PG — HIGH (ref 27–31)
MCHC RBC-ENTMCNC: 34 G/DL — SIGNIFICANT CHANGE UP (ref 32–37)
MCV RBC AUTO: 92.4 FL — SIGNIFICANT CHANGE UP (ref 80–94)
MONOCYTES # BLD AUTO: 0.75 K/UL — HIGH (ref 0.1–0.6)
MONOCYTES NFR BLD AUTO: 9 % — SIGNIFICANT CHANGE UP (ref 1.7–9.3)
NEUTROPHILS # BLD AUTO: 4.42 K/UL — SIGNIFICANT CHANGE UP (ref 1.4–6.5)
NEUTROPHILS NFR BLD AUTO: 52.7 % — SIGNIFICANT CHANGE UP (ref 42.2–75.2)
NITRITE UR-MCNC: NEGATIVE — SIGNIFICANT CHANGE UP
NRBC # BLD: 0 /100 WBCS — SIGNIFICANT CHANGE UP (ref 0–0)
PH UR: 6 — SIGNIFICANT CHANGE UP (ref 5–8)
PLATELET # BLD AUTO: 174 K/UL — SIGNIFICANT CHANGE UP (ref 130–400)
POTASSIUM SERPL-MCNC: 3.7 MMOL/L — SIGNIFICANT CHANGE UP (ref 3.5–5)
POTASSIUM SERPL-SCNC: 3.7 MMOL/L — SIGNIFICANT CHANGE UP (ref 3.5–5)
PROT SERPL-MCNC: 6.2 G/DL — SIGNIFICANT CHANGE UP (ref 6–8)
PROT UR-MCNC: ABNORMAL
PROTHROM AB SERPL-ACNC: 12.1 SEC — SIGNIFICANT CHANGE UP (ref 9.95–12.87)
RBC # BLD: 3.82 M/UL — LOW (ref 4.7–6.1)
RBC # FLD: 12.8 % — SIGNIFICANT CHANGE UP (ref 11.5–14.5)
RBC CASTS # UR COMP ASSIST: 31 /HPF — HIGH (ref 0–4)
SODIUM SERPL-SCNC: 137 MMOL/L — SIGNIFICANT CHANGE UP (ref 135–146)
SP GR SPEC: 1.01 — LOW (ref 1.01–1.03)
UROBILINOGEN FLD QL: SIGNIFICANT CHANGE UP
WBC # BLD: 8.37 K/UL — SIGNIFICANT CHANGE UP (ref 4.8–10.8)
WBC # FLD AUTO: 8.37 K/UL — SIGNIFICANT CHANGE UP (ref 4.8–10.8)
WBC UR QL: 6 /HPF — HIGH (ref 0–5)

## 2021-06-27 PROCEDURE — 99283 EMERGENCY DEPT VISIT LOW MDM: CPT

## 2021-06-27 PROCEDURE — 99285 EMERGENCY DEPT VISIT HI MDM: CPT

## 2021-06-27 PROCEDURE — 76770 US EXAM ABDO BACK WALL COMP: CPT | Mod: 26

## 2021-06-27 NOTE — ED PROVIDER NOTE - ATTENDING CONTRIBUTION TO CARE
I personally evaluated the patient. I reviewed the Resident’s or Physician Assistant’s note (as assigned above), and agree with the findings and plan except as documented in my note.    Pt is a 66 y/o male with hx of HTN, dLD CAD on plavix, s/p TURPT with Dr. Haney 2 weeks ago presents for continued hematuria since procedure, moderate, with clots. PT was here two days ago, seen by urology, labs done, sent home. Plavix stopped. PT states hematuria continues with large amount of clots this am. No abd pain, n/v/d, chest pain, SOB, dizziness.    Constitutional: Well developed, well nourished. NAD.  Head: Normocephalic, atraumatic.  Eyes: PERRL. EOMI.  ENT: No nasal discharge. Mucous membranes moist.  Neck: Supple. Painless ROM.  Cardiovascular: Normal S1, S2. Regular rate and rhythm. No murmurs, rubs, or gallops.  Pulmonary: Normal respiratory rate and effort. Lungs clear to auscultation bilaterally. No wheezing, rales, or rhonchi.  Abdominal: Soft. Nondistended. Nontender. No rebound, guarding, rigidity.  Extremities. Pelvis stable. No lower extremity edema, symmetric calves.  Skin: No rashes, cyanosis.  Neuro: AAOx3. No focal neurological deficits.  Psych: Normal mood. Normal affect.

## 2021-06-27 NOTE — ED ADULT TRIAGE NOTE - CHIEF COMPLAINT QUOTE
c/o hematuria since thurs. Pt was recently in ed was d/c Saturday night. Pt on eliquis c/o sob and dizziness. c/o hematuria since thurs. Pt recently had a procedure to have a "urethral" mass removed and bleeding began 9 days post op. Pt on Eliquis but stopped yesterday.  now c/o sob and dizziness since yesterday.

## 2021-06-27 NOTE — ED PROVIDER NOTE - PATIENT PORTAL LINK FT
You can access the FollowMyHealth Patient Portal offered by [facility] by registering at the following website: [email or web address]. By joining [email or web address]’s FollowMyHealth portal, you will also be able to view your health information using other applications (apps) compatible with our system.

## 2021-06-27 NOTE — ED PROVIDER NOTE - NS ED ROS FT
Constitutional:  (-) fever, (-) chills, (-) lethargy  Eyes:  (-) eye pain (-) visual changes  ENMT: (-) nasal discharge, (-) sore throat. (-) neck pain or stiffness  Cardiac: (-) chest pain (-) palpitations  Respiratory:  (-) cough (-) respiratory distress.   GI:  (-) nausea (-) vomiting (-) diarrhea (-) abdominal pain.  :  (-) dysuria (-) frequency (-) burning (+) hematuria  MS:  (-) back pain (-) joint pain.  Neuro:  (-) headache (-) numbness (-) tingling (-) focal weakness  Skin:  (-) rash  Except as documented in the HPI,  all other systems are negative

## 2021-06-27 NOTE — ED ADULT NURSE NOTE - CHIEF COMPLAINT QUOTE
c/o hematuria since thurs. Pt recently had a procedure to have a "urethral" mass removed and bleeding began 9 days post op. Pt on Eliquis but stopped yesterday.  now c/o sob and dizziness since yesterday.

## 2021-06-27 NOTE — ED PROVIDER NOTE - CLINICAL SUMMARY MEDICAL DECISION MAKING FREE TEXT BOX
Pt presented to ED for hematuria, hx of similar s/p procedure. Clots at home but improved at this time. Labs, imaging reviewed. Pt evaluated by urology, refusing yeboah. Currently VS stable and pt has followup tomorrow. Can dc. Pt agreeable with plan. Results reviewed and discussed with pt and printed for patient. Anticipatory guidance given including close outpatient followup. Strict return precautions given. Pt verbalizes understanding of and agrees with plan.

## 2021-06-27 NOTE — ED PROVIDER NOTE - CARE PROVIDER_API CALL
Van Haney)  Urology  [address] [place] [address], [address]  [place] [address]  Phone: [phone]  Fax: [phone]  Scheduled Appointment: 06/28/2021

## 2021-06-27 NOTE — CONSULT NOTE ADULT - ATTENDING COMMENTS
PT seen and examined 6/27/21. doing better after voiding a few old clots likely which started after initiating plavix. now urine is clear jose armando w occasional. no c lots since the void earlier today w c lots.  pt refusing yeboah to irrigate old clots,  lynnette see him james in office    ua  ucx  cont asa, and hold plavix

## 2021-06-27 NOTE — CONSULT NOTE ADULT - ASSESSMENT
66y/o M on ASA/Plavix s/p TURBT on 6/11/21 presenting with hematuria and clots, improving and now subsequent voids are clear jose armando colored urine.    - No acute  intervention at this time   - Obtain R/B sono to evaluate for clot burden within bladder  - Pt was offered catheter placement and bladder irrigation but refused.  - F/u with Dr. Haney as o/p as scheduled tomorrow 9am

## 2021-06-27 NOTE — ED PROVIDER NOTE - OBJECTIVE STATEMENT
64 yo M with PMH of CAD s/p stent, HTN, HLD, aortic dissection, recent TURBT 6/11 ([facility]) presenting for hematuria. Patient said symptoms started 4 days ago, was seen in ER 2 days ago and discharged. Since then, he has had a large amount of bright red blood in urine with a large amount of clots. Patient has no bleeding without urination, dysuria, penile discharge, scrotal swelling, abdominal pain, nausea, vomiting, diarrhea, hematochezia, melena, dizziness, headache, vision changes. No chest pain or shortness of breath.

## 2021-06-28 ENCOUNTER — NON-APPOINTMENT (OUTPATIENT)
Age: 66
End: 2021-06-28

## 2021-06-28 ENCOUNTER — APPOINTMENT (OUTPATIENT)
Dept: UROLOGY | Facility: CLINIC | Age: 66
End: 2021-06-28
Payer: COMMERCIAL

## 2021-06-28 VITALS — HEIGHT: 64 IN | WEIGHT: 160 LBS | TEMPERATURE: 97.1 F | BODY MASS INDEX: 27.31 KG/M2

## 2021-06-28 DIAGNOSIS — C67.9 MALIGNANT NEOPLASM OF BLADDER, UNSPECIFIED: ICD-10-CM

## 2021-06-28 DIAGNOSIS — R31.9 HEMATURIA, UNSPECIFIED: ICD-10-CM

## 2021-06-28 LAB — BACTERIA UR CULT: NORMAL

## 2021-06-28 PROCEDURE — 99214 OFFICE O/P EST MOD 30 MIN: CPT

## 2021-06-28 NOTE — ASSESSMENT
[FreeTextEntry1] : ABDIFATAH EID is a 65 year old male on AC, asa, former smoker who presents for consultation for gross hematuria and acute urinary retention. s/p cystoscopy (4/22/2021) concern for urothelial carcinoma status post TURBT of a sessile posterior bladder wall mid trigone tumor, completely resected down to muscle, superficial and deep bladder tumor path LG Ta UC no invasion 6/11/21.\par \par -Cystoscopy, discussed this endoscopic procedure with the patient\par -Increase hydration daily \par he  will cont dc plavix for now, cont asa 81

## 2021-06-28 NOTE — HISTORY OF PRESENT ILLNESS
[FreeTextEntry1] : ABDIFATAH EID is a 65 year old male on AC, asa, former smoker who presents for consultation for gross hematuria and acute urinary retention. s/p cystoscopy (4/22/2021) concern for urothelial carcinoma, CIS status post TURBT of a sessile posterior bladder wall mid trigone tumor, completely resected down to muscle, superficial and deep bladder tumor path LG Ta UC no invasion 6/11/21.\par \par Pt reports he is not experiencing blood clots in the urine at this time. He reports the urine is of tan color. \par Pt reports no dysuria, no f/c or any other related Sx. no f/c.\par \par UCx from 6/26/2021: ngtd \par \par Pathology from 6/11/2021: \par Bladder tumor, posterior wall, superficial, transurethral resection: Fragments of low grade papillary urothelial carcinoma, non-invasive. Rare fragments of muscularis propria are identified, not involved by tumor.\par Bladder tumor, posterior wall, deep, transurethral resection: Few fragments of low grade papillary urothelial carcinoma. No invasion into the lamina propria seen. Fragments of muscularis propria are identified, not involved\par by tumor.\par \par Previously:\par Urine Cytology from 4/2021: Atypical findings \par \par Cysto 4/22/21 diffuse erythema, with papillary carpeting especially left trigone, inflammatory polyp vs tumor bladder dome. 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 No other evidence of acute abdominal pelvic pathology.  CT scan images visualized with patient prostate is moderately enlarged though I do not appreciate significant intravesical lobe.normal kidneys\par \par Denies  PMH of recurrent UTIs.\par Family History: No  malignancies\par Social History: Former cigarette smoker, denies EtOH abuse or illicit drug use.\par \par Old records reviewed\par Labs from 4/4/2021:\par CBC within normal limits\par BMP demonstrates a creatinine of 0.9 with an estimated GFR of 89\par Urine testing demonstrates greater than 720 RBCs\par Culture negative\par \par

## 2021-06-28 NOTE — ADDENDUM
[FreeTextEntry1] : Patient's note was transcribed with the assistance of a medical scribe under the supervision of Dr. Haney.\par I, Dr. Haney, have reviewed the patient's chart and agree that it aligns with my medical decisions.\par Eric Lewis, our scribe, also served as a chaperone for physical examination purposes.\par \par \par

## 2021-06-30 LAB
CULTURE RESULTS: SIGNIFICANT CHANGE UP
SPECIMEN SOURCE: SIGNIFICANT CHANGE UP

## 2021-09-23 ENCOUNTER — APPOINTMENT (OUTPATIENT)
Dept: UROLOGY | Facility: CLINIC | Age: 66
End: 2021-09-23

## 2021-10-11 DIAGNOSIS — I71.4 ABDOMINAL AORTIC ANEURYSM, W/OUT RUPTURE: ICD-10-CM

## 2021-10-18 ENCOUNTER — APPOINTMENT (OUTPATIENT)
Dept: VASCULAR SURGERY | Facility: CLINIC | Age: 66
End: 2021-10-18

## 2022-01-10 NOTE — PATIENT PROFILE ADULT - HEALTH LITERACY
Detail Level: Detailed no Quality 402: Tobacco Use And Help With Quitting Among Adolescents: Patient screened for tobacco and never smoked

## 2022-03-10 NOTE — ED PROVIDER NOTE - NSDCPRINTRESULTS_ED_ALL_ED
Patient requests all Lab and Radiology Results on their Discharge Instructions Pf[employer] dose 1, 2, and 3

## 2023-02-17 NOTE — ED ADULT NURSE NOTE - NS ED PATIENT SAFETY CONCERN
Case Management Discharge Planning    Admission Date: 2/14/2023  GMLOS: 2.1  ALOS: 3    6-Clicks ADL Score: 24  6-Clicks Mobility Score: 24      Anticipated Discharge Dispo: Discharge Disposition: Discharged to home/self care (01)    DME Needed: No    Action(s) Taken:     RNCM notified by MD that pt will be transferring to [facility] for heart cath.    PCS form, facesheet, and approved service form faxed to transfer center. Transfer center confirmed paperwork was received.    Per Malgorzata, transport time is 1130 via [facility]. COBRA delivered to [facility].    Escalations Completed: None    Medically Clear: No    Next Steps: f/u with [facility] regarding transfer time    Barriers to Discharge: Medical clearance           No

## 2023-02-27 NOTE — ED ADULT NURSE NOTE - NS ED NURSE LEVEL OF CONSCIOUSNESS ORIENTATION
Good nutrition is important when healing from an illness, injury, or surgery. Follow any nutrition recommendations given to you during your hospital stay. If you were given an oral nutrition supplement while in the hospital, continue to take this supplement at home. You can take it with meals, in-between meals, and/or before bedtime. These supplements can be purchased at most local grocery stores, pharmacies, and chain [email or web address]. If you have any questions about your diet or nutrition, call the hospital and ask for the dietitian.   Cardiac diet
Oriented - self; Oriented - place; Oriented - time

## 2023-06-10 NOTE — DISCHARGE NOTE PROVIDER - NSDCFUADDINST_GEN_ALL_CORE_FT
* Return to Emergency room if develop any persistent fever > 101, chills, tremors, persistent nausea/vomiting, severe pain not relieved by pain medication, inability to urinate, chest pains, difficulty breathing or any persistent bleeding.    * May continue your Aspirin and Resume your Plavix on Saturday June 19, 2021. 
Statement Selected

## 2023-08-31 NOTE — H&P ADULT - NSHPSOCIALHISTORY_GEN_ALL_CORE
Previous smoker, stopped smoking 3 months ago, used to smoke 5 cigarettes / day   Drinks half a glass of wine per day and beer on occasions   No drug use Chronic atrial fibrillation Chronic atrial fibrillation Chronic atrial fibrillation

## 2024-08-26 NOTE — ED ADULT NURSE NOTE - ISOLATION TYPE:
Called patient's wife as his phone was not in service. I let her know that the patient already had an appt set up for 9/9/24 at 1:45. They will be here she said. 8/26/24 AH  
None

## 2024-09-27 NOTE — ED ADULT NURSE NOTE - COVID-19 RESULT
[TextEntry] : No need for further w/u for LUZ MARINA at this point given lack of symptoms. If any change, he is advised to RTC. Continue pulm f/u with Dr Ugalde. Cardio f/u.  NEGATIVE

## 2025-04-08 ENCOUNTER — APPOINTMENT (OUTPATIENT)
Dept: OTOLARYNGOLOGY | Facility: CLINIC | Age: 70
End: 2025-04-08

## 2025-06-18 NOTE — PATIENT PROFILE ADULT - VISION (WITH CORRECTIVE LENSES IF THE PATIENT USUALLY WEARS THEM):
Speech Therapy      Visit Type: Treatment  -  Swallow    Relevant History/Co-morbidities: Pertinent PMH:  DM, CKD, CAD    6/13/25: esophgram   6/12/25: VFSS  6/11/25: clinical swallow eval  6/10/25: CT chest remarkable for \"small-to-moderate bilateral pleural effusions with moderate atelectasis in both lower lobes. Mildly patulous esophagus with mild to moderate food debris in the upper and mid segments. Possible underlying esophageal  dysmotility.\"  6/4/25: IABP  6/3/25: stat team called  6/2/25: patient admitted for elective posterior lumbar decompression and fusion. Intubated for surgery     Prior Speech Therapy:  4/6/18: communication/cognition eval at [facility]    SUBJECTIVE  Patient alert, not feeling well. \"My legs are twitching, feeling like throwing up\". RN approved session. Seen on [facility]    Pain at onset of session:   RN informed on pain level.    - Location: leg and back      OBJECTIVE      - Feeding: feeds self    Swallow  No temperature spike noted.  Patient positioning: partially reclined in bed to 30 degrees  Pretrial vocal quality: normal No pain associated with swallow.    Numbers listed with consistency indicate IDDSI diet level.  Oral and pharyngeal phases assessed and found clinically unremarkable for thin (0), single swallow teaspoon.    Esophageal symptoms: present (nausea reported, belching noted)            Test and Outcome Measures  Outcome Measures:   Functional Oral Intake Scale (FOIS): FOIS: Level 6 - Total oral diet with multiple consistencies without special preparation, but with specific food limitations           Education:   - Present and ready to learn: patient  Education provided during session:  - dysphagia and aspiration precautions (reflux precaution)  - Results of above outlined education: Verbalizes understanding and Needs reinforcement    ASSESSMENT  Progress: Progressing toward goals    Discharge needs based on today's assessment:  - Current level of function: slightly  below baseline level of function  - Therapy needs at discharge: therapy 5 or more times per week  - ADL / IADLs (activities / instrumental activities of daily living) requiring support at discharge: nutrition management (eating/drinking)  - Impairments that require further therapy intervention: dysphagia    Focus of the session was on assess tolerance and compensatory strategies training. With limited intake, no overt signs of aspiration was noted with sherbet that patient consumed. Patient educated on reflux precautions and emphasis on effortful swallows. Due to pain and weakness, she was not able to be upright and limited participation. Speech therapy follow to assess tolerance and ongoing education    PLAN (while hospitalized)  Swallow: F/u to assess reg/thin with education GERD precautions and on results of esophagram. Consider initiating effortful swallow with puree bolus in an effort to strengthen deficits observed on VFSS.     SLP Frequency: 1-2 x per week    SLP Identified Barriers to Discharge: medical      RECOMMENDATIONS     -Diet:          *Regular and Liquid- Thin    -Medication Administration:         *with puree    -Feeding Guidelines:          *POSITIONING -, stay upright after meals , sit up straight in bed/chair , SUPERVISION -, constant supervision by staff , STRATEGIES -, small single sips  , small bites , slow rate of intake  and alternate liquids and solids     -Additional Swallow Recommendations:         *frequent oral care    -Speech Reviewed Swallow:         *with patient/family, with clinical caregivers and feeding guidelines posted in room  -Additional recommendations:   -Complete BID oral care with toothbrush/toothpaste and increase mobility throughout the day as medically appropriate to assist in controlling risk factors for potentially developing dysphagia-related pulmonary complications.     -if concern for poor tolerance, please hold PO and contact SLP      GOALS  Review Date:  6/25/2025  Short Term Goals:   Short Term Swallow Goals:    Patient will tolerate oral diet of IDDSI Level 6 (soft and bite sized) and thin liquids with adequate mastication/oral clearance, less than 10% SS of penetration/aspiration - met         Long Term Goals: (to be met by time of discharge from hospital)  Patient will tolerate oral diet of general consistency solids and thin liquids with adequate mastication/oral clearance, less than 10% SS of penetration/aspiration and stable temps/lungs    Patient at End of Session:   Location: in bed  Safety measures: call light within reach  Handoff to: nurse  Documented in the chart in the following areas: Assessment.         Therapy procedure time and total treatment time can be found documented on the Time Entry flowsheet   Normal vision: sees adequately in most situations; can see medication labels, newsprint